# Patient Record
Sex: MALE | Race: BLACK OR AFRICAN AMERICAN | NOT HISPANIC OR LATINO | URBAN - METROPOLITAN AREA
[De-identification: names, ages, dates, MRNs, and addresses within clinical notes are randomized per-mention and may not be internally consistent; named-entity substitution may affect disease eponyms.]

---

## 2024-06-06 ENCOUNTER — TRANSCRIBE ORDERS (OUTPATIENT)
Dept: GENERAL RADIOLOGY | Facility: HOSPITAL | Age: 59
End: 2024-06-06

## 2024-11-17 ENCOUNTER — APPOINTMENT (OUTPATIENT)
Dept: ULTRASOUND IMAGING | Facility: HOSPITAL | Age: 59
DRG: 066 | End: 2024-11-17
Payer: MEDICAID

## 2024-11-17 ENCOUNTER — HOSPITAL ENCOUNTER (INPATIENT)
Facility: HOSPITAL | Age: 59
LOS: 4 days | Discharge: HOME | DRG: 066 | End: 2024-11-21
Attending: EMERGENCY MEDICINE | Admitting: INTERNAL MEDICINE
Payer: MEDICAID

## 2024-11-17 ENCOUNTER — APPOINTMENT (OUTPATIENT)
Dept: GENERAL RADIOLOGY | Facility: HOSPITAL | Age: 59
DRG: 066 | End: 2024-11-17
Payer: MEDICAID

## 2024-11-17 ENCOUNTER — APPOINTMENT (OUTPATIENT)
Dept: CT IMAGING | Facility: HOSPITAL | Age: 59
DRG: 066 | End: 2024-11-17
Payer: MEDICAID

## 2024-11-17 DIAGNOSIS — R13.10 DYSPHAGIA, UNSPECIFIED TYPE: ICD-10-CM

## 2024-11-17 DIAGNOSIS — R79.89 ELEVATED SERUM CREATININE: ICD-10-CM

## 2024-11-17 DIAGNOSIS — R20.0 LEFT SIDED NUMBNESS: Primary | ICD-10-CM

## 2024-11-17 DIAGNOSIS — I63.89 CEREBROVASCULAR ACCIDENT (CVA) DUE TO OTHER MECHANISM: ICD-10-CM

## 2024-11-17 DIAGNOSIS — R29.90 STROKE-LIKE SYMPTOMS: ICD-10-CM

## 2024-11-17 DIAGNOSIS — I10 SEVERE HYPERTENSION: ICD-10-CM

## 2024-11-17 DIAGNOSIS — Z78.9 DECREASED ACTIVITIES OF DAILY LIVING (ADL): ICD-10-CM

## 2024-11-17 PROBLEM — I63.9 STROKE: Status: ACTIVE | Noted: 2024-11-17

## 2024-11-17 LAB
ABO GROUP BLD: NORMAL
ALBUMIN SERPL-MCNC: 4.2 G/DL (ref 3.5–5.2)
ALBUMIN/GLOB SERPL: 1.2 G/DL
ALP SERPL-CCNC: 95 U/L (ref 39–117)
ALT SERPL W P-5'-P-CCNC: 26 U/L (ref 1–41)
ANION GAP SERPL CALCULATED.3IONS-SCNC: 10.3 MMOL/L (ref 5–15)
APTT PPP: 33.9 SECONDS (ref 24.2–34.2)
AST SERPL-CCNC: 23 U/L (ref 1–40)
BACTERIA UR QL AUTO: NORMAL /HPF
BASOPHILS # BLD AUTO: 0.03 10*3/MM3 (ref 0–0.2)
BASOPHILS NFR BLD AUTO: 0.5 % (ref 0–1.5)
BILIRUB SERPL-MCNC: 0.4 MG/DL (ref 0–1.2)
BILIRUB UR QL STRIP: NEGATIVE
BLD GP AB SCN SERPL QL: NEGATIVE
BUN SERPL-MCNC: 22 MG/DL (ref 6–20)
BUN/CREAT SERPL: 10.2 (ref 7–25)
CALCIUM SPEC-SCNC: 9.2 MG/DL (ref 8.6–10.5)
CHLORIDE SERPL-SCNC: 106 MMOL/L (ref 98–107)
CHOLEST SERPL-MCNC: 273 MG/DL (ref 0–200)
CLARITY UR: CLEAR
CO2 SERPL-SCNC: 25.7 MMOL/L (ref 22–29)
COLOR UR: YELLOW
CREAT SERPL-MCNC: 2.16 MG/DL (ref 0.76–1.27)
DEPRECATED RDW RBC AUTO: 40.9 FL (ref 37–54)
EGFRCR SERPLBLD CKD-EPI 2021: 34.4 ML/MIN/1.73
EOSINOPHIL # BLD AUTO: 0.11 10*3/MM3 (ref 0–0.4)
EOSINOPHIL NFR BLD AUTO: 2 % (ref 0.3–6.2)
ERYTHROCYTE [DISTWIDTH] IN BLOOD BY AUTOMATED COUNT: 12.5 % (ref 12.3–15.4)
GLOBULIN UR ELPH-MCNC: 3.4 GM/DL
GLUCOSE BLDC GLUCOMTR-MCNC: 113 MG/DL (ref 70–99)
GLUCOSE SERPL-MCNC: 100 MG/DL (ref 65–99)
GLUCOSE UR STRIP-MCNC: NEGATIVE MG/DL
HCT VFR BLD AUTO: 47.4 % (ref 37.5–51)
HDLC SERPL-MCNC: 44 MG/DL (ref 40–60)
HGB BLD-MCNC: 15.5 G/DL (ref 13–17.7)
HGB UR QL STRIP.AUTO: NEGATIVE
HOLD SPECIMEN: NORMAL
HOLD SPECIMEN: NORMAL
HYALINE CASTS UR QL AUTO: NORMAL /LPF
IMM GRANULOCYTES # BLD AUTO: 0.01 10*3/MM3 (ref 0–0.05)
IMM GRANULOCYTES NFR BLD AUTO: 0.2 % (ref 0–0.5)
INR PPP: 1.03 (ref 0.86–1.15)
KETONES UR QL STRIP: NEGATIVE
LDLC SERPL CALC-MCNC: 180 MG/DL (ref 0–100)
LDLC/HDLC SERPL: 4.05 {RATIO}
LEUKOCYTE ESTERASE UR QL STRIP.AUTO: NEGATIVE
LYMPHOCYTES # BLD AUTO: 1.14 10*3/MM3 (ref 0.7–3.1)
LYMPHOCYTES NFR BLD AUTO: 20.5 % (ref 19.6–45.3)
MCH RBC QN AUTO: 29.1 PG (ref 26.6–33)
MCHC RBC AUTO-ENTMCNC: 32.7 G/DL (ref 31.5–35.7)
MCV RBC AUTO: 88.9 FL (ref 79–97)
MONOCYTES # BLD AUTO: 0.62 10*3/MM3 (ref 0.1–0.9)
MONOCYTES NFR BLD AUTO: 11.2 % (ref 5–12)
NEUTROPHILS NFR BLD AUTO: 3.64 10*3/MM3 (ref 1.7–7)
NEUTROPHILS NFR BLD AUTO: 65.6 % (ref 42.7–76)
NITRITE UR QL STRIP: NEGATIVE
NRBC BLD AUTO-RTO: 0 /100 WBC (ref 0–0.2)
PH UR STRIP.AUTO: 5.5 [PH] (ref 5–8)
PLATELET # BLD AUTO: 281 10*3/MM3 (ref 140–450)
PMV BLD AUTO: 11.2 FL (ref 6–12)
POTASSIUM SERPL-SCNC: 4.3 MMOL/L (ref 3.5–5.2)
PROT SERPL-MCNC: 7.6 G/DL (ref 6–8.5)
PROT UR QL STRIP: ABNORMAL
PROTHROMBIN TIME: 13.7 SECONDS (ref 11.8–14.9)
RBC # BLD AUTO: 5.33 10*6/MM3 (ref 4.14–5.8)
RBC # UR STRIP: NORMAL /HPF
REF LAB TEST METHOD: NORMAL
RH BLD: POSITIVE
SODIUM SERPL-SCNC: 142 MMOL/L (ref 136–145)
SP GR UR STRIP: 1.02 (ref 1–1.03)
SQUAMOUS #/AREA URNS HPF: NORMAL /HPF
T&S EXPIRATION DATE: NORMAL
TRIGL SERPL-MCNC: 255 MG/DL (ref 0–150)
TROPONIN T SERPL HS-MCNC: 22 NG/L
TSH SERPL DL<=0.05 MIU/L-ACNC: 3.6 UIU/ML (ref 0.27–4.2)
UROBILINOGEN UR QL STRIP: ABNORMAL
VLDLC SERPL-MCNC: 49 MG/DL (ref 5–40)
WBC # UR STRIP: NORMAL /HPF
WBC NRBC COR # BLD AUTO: 5.55 10*3/MM3 (ref 3.4–10.8)
WHOLE BLOOD HOLD COAG: NORMAL
WHOLE BLOOD HOLD SPECIMEN: NORMAL

## 2024-11-17 PROCEDURE — 85610 PROTHROMBIN TIME: CPT | Performed by: EMERGENCY MEDICINE

## 2024-11-17 PROCEDURE — 82948 REAGENT STRIP/BLOOD GLUCOSE: CPT | Performed by: EMERGENCY MEDICINE

## 2024-11-17 PROCEDURE — 93005 ELECTROCARDIOGRAM TRACING: CPT | Performed by: EMERGENCY MEDICINE

## 2024-11-17 PROCEDURE — 86901 BLOOD TYPING SEROLOGIC RH(D): CPT | Performed by: EMERGENCY MEDICINE

## 2024-11-17 PROCEDURE — 99291 CRITICAL CARE FIRST HOUR: CPT

## 2024-11-17 PROCEDURE — 99223 1ST HOSP IP/OBS HIGH 75: CPT | Performed by: STUDENT IN AN ORGANIZED HEALTH CARE EDUCATION/TRAINING PROGRAM

## 2024-11-17 PROCEDURE — 84443 ASSAY THYROID STIM HORMONE: CPT | Performed by: STUDENT IN AN ORGANIZED HEALTH CARE EDUCATION/TRAINING PROGRAM

## 2024-11-17 PROCEDURE — 76775 US EXAM ABDO BACK WALL LIM: CPT

## 2024-11-17 PROCEDURE — 85730 THROMBOPLASTIN TIME PARTIAL: CPT | Performed by: EMERGENCY MEDICINE

## 2024-11-17 PROCEDURE — 85025 COMPLETE CBC W/AUTO DIFF WBC: CPT | Performed by: EMERGENCY MEDICINE

## 2024-11-17 PROCEDURE — 80061 LIPID PANEL: CPT | Performed by: STUDENT IN AN ORGANIZED HEALTH CARE EDUCATION/TRAINING PROGRAM

## 2024-11-17 PROCEDURE — 83036 HEMOGLOBIN GLYCOSYLATED A1C: CPT | Performed by: STUDENT IN AN ORGANIZED HEALTH CARE EDUCATION/TRAINING PROGRAM

## 2024-11-17 PROCEDURE — 25810000003 SODIUM CHLORIDE 0.9 % SOLUTION: Performed by: EMERGENCY MEDICINE

## 2024-11-17 PROCEDURE — 84484 ASSAY OF TROPONIN QUANT: CPT | Performed by: EMERGENCY MEDICINE

## 2024-11-17 PROCEDURE — 81001 URINALYSIS AUTO W/SCOPE: CPT | Performed by: EMERGENCY MEDICINE

## 2024-11-17 PROCEDURE — 99285 EMERGENCY DEPT VISIT HI MDM: CPT

## 2024-11-17 PROCEDURE — 25810000003 LACTATED RINGERS PER 1000 ML: Performed by: STUDENT IN AN ORGANIZED HEALTH CARE EDUCATION/TRAINING PROGRAM

## 2024-11-17 PROCEDURE — 86900 BLOOD TYPING SEROLOGIC ABO: CPT | Performed by: EMERGENCY MEDICINE

## 2024-11-17 PROCEDURE — 71045 X-RAY EXAM CHEST 1 VIEW: CPT

## 2024-11-17 PROCEDURE — 70450 CT HEAD/BRAIN W/O DYE: CPT

## 2024-11-17 PROCEDURE — 80053 COMPREHEN METABOLIC PANEL: CPT | Performed by: EMERGENCY MEDICINE

## 2024-11-17 PROCEDURE — 25010000002 ACETAMINOPHEN 10 MG/ML SOLUTION: Performed by: EMERGENCY MEDICINE

## 2024-11-17 PROCEDURE — 86850 RBC ANTIBODY SCREEN: CPT | Performed by: EMERGENCY MEDICINE

## 2024-11-17 RX ORDER — ACETAMINOPHEN 10 MG/ML
1000 INJECTION, SOLUTION INTRAVENOUS ONCE
Status: COMPLETED | OUTPATIENT
Start: 2024-11-17 | End: 2024-11-17

## 2024-11-17 RX ORDER — SODIUM CHLORIDE 0.9 % (FLUSH) 0.9 %
10 SYRINGE (ML) INJECTION EVERY 12 HOURS SCHEDULED
Status: DISCONTINUED | OUTPATIENT
Start: 2024-11-17 | End: 2024-11-21 | Stop reason: HOSPADM

## 2024-11-17 RX ORDER — ONDANSETRON 2 MG/ML
4 INJECTION INTRAMUSCULAR; INTRAVENOUS EVERY 6 HOURS PRN
Status: DISCONTINUED | OUTPATIENT
Start: 2024-11-17 | End: 2024-11-21 | Stop reason: HOSPADM

## 2024-11-17 RX ORDER — SODIUM CHLORIDE 0.9 % (FLUSH) 0.9 %
10 SYRINGE (ML) INJECTION AS NEEDED
Status: DISCONTINUED | OUTPATIENT
Start: 2024-11-17 | End: 2024-11-21 | Stop reason: HOSPADM

## 2024-11-17 RX ORDER — POLYETHYLENE GLYCOL 3350 17 G/17G
17 POWDER, FOR SOLUTION ORAL DAILY PRN
Status: DISCONTINUED | OUTPATIENT
Start: 2024-11-17 | End: 2024-11-21 | Stop reason: HOSPADM

## 2024-11-17 RX ORDER — BISACODYL 10 MG
10 SUPPOSITORY, RECTAL RECTAL DAILY PRN
Status: DISCONTINUED | OUTPATIENT
Start: 2024-11-17 | End: 2024-11-21 | Stop reason: HOSPADM

## 2024-11-17 RX ORDER — ACETAMINOPHEN 325 MG/1
650 TABLET ORAL EVERY 6 HOURS PRN
Status: DISCONTINUED | OUTPATIENT
Start: 2024-11-17 | End: 2024-11-21 | Stop reason: HOSPADM

## 2024-11-17 RX ORDER — HYDRALAZINE HYDROCHLORIDE 20 MG/ML
10 INJECTION INTRAMUSCULAR; INTRAVENOUS EVERY 6 HOURS PRN
Status: DISCONTINUED | OUTPATIENT
Start: 2024-11-17 | End: 2024-11-21 | Stop reason: HOSPADM

## 2024-11-17 RX ORDER — NITROGLYCERIN 0.4 MG/1
0.4 TABLET SUBLINGUAL
Status: DISCONTINUED | OUTPATIENT
Start: 2024-11-17 | End: 2024-11-21 | Stop reason: HOSPADM

## 2024-11-17 RX ORDER — SODIUM CHLORIDE 9 MG/ML
40 INJECTION, SOLUTION INTRAVENOUS AS NEEDED
Status: DISCONTINUED | OUTPATIENT
Start: 2024-11-17 | End: 2024-11-21 | Stop reason: HOSPADM

## 2024-11-17 RX ORDER — BISACODYL 5 MG/1
5 TABLET, DELAYED RELEASE ORAL DAILY PRN
Status: DISCONTINUED | OUTPATIENT
Start: 2024-11-17 | End: 2024-11-21 | Stop reason: HOSPADM

## 2024-11-17 RX ORDER — SODIUM CHLORIDE, SODIUM LACTATE, POTASSIUM CHLORIDE, CALCIUM CHLORIDE 600; 310; 30; 20 MG/100ML; MG/100ML; MG/100ML; MG/100ML
50 INJECTION, SOLUTION INTRAVENOUS CONTINUOUS
Status: DISCONTINUED | OUTPATIENT
Start: 2024-11-17 | End: 2024-11-18

## 2024-11-17 RX ORDER — ASPIRIN 325 MG
325 TABLET ORAL DAILY
Status: DISCONTINUED | OUTPATIENT
Start: 2024-11-18 | End: 2024-11-18

## 2024-11-17 RX ORDER — AMOXICILLIN 250 MG
2 CAPSULE ORAL 2 TIMES DAILY PRN
Status: DISCONTINUED | OUTPATIENT
Start: 2024-11-17 | End: 2024-11-21 | Stop reason: HOSPADM

## 2024-11-17 RX ADMIN — Medication 10 ML: at 23:05

## 2024-11-17 RX ADMIN — SODIUM CHLORIDE 1000 ML: 9 INJECTION, SOLUTION INTRAVENOUS at 20:03

## 2024-11-17 RX ADMIN — ACETAMINOPHEN 1000 MG: 10 INJECTION INTRAVENOUS at 20:03

## 2024-11-17 RX ADMIN — SODIUM CHLORIDE, POTASSIUM CHLORIDE, SODIUM LACTATE AND CALCIUM CHLORIDE 50 ML/HR: 600; 310; 30; 20 INJECTION, SOLUTION INTRAVENOUS at 23:04

## 2024-11-17 NOTE — ED PROVIDER NOTES
"Time: 6:28 PM EST  Date of encounter:  11/17/2024  Independent Historian/Clinical History and Information was obtained by:   Patient    History is limited by: N/A    Chief Complaint: Speech difficulty, numbness      History of Present Illness:  Patient is a 58 y.o. year old male who presents to the emergency department for evaluation of left-sided numbness, left arm numbness and facial numbness and difficulty with his speech.  States that it started around 6 PM this evening.  Patient does report the numbness is improved but his speech still does not sound the same and he is having difficulty getting his words out.      Patient Care Team  Primary Care Provider: Provider, No Known    Past Medical History:     No Known Allergies  No past medical history on file.  No past surgical history on file.  No family history on file.    Home Medications:  Prior to Admission medications    Not on File        Social History:          Review of Systems:  Review of Systems   Neurological:  Positive for speech difficulty and numbness.        Physical Exam:  BP (!) 199/115   Pulse 69   Temp 98.9 °F (37.2 °C) (Oral)   Resp 18   Ht 175.3 cm (69\")   Wt 109 kg (240 lb 15.4 oz)   SpO2 97%   BMI 35.58 kg/m²     Physical Exam  Vitals and nursing note reviewed.   Constitutional:       Appearance: Normal appearance.   HENT:      Head: Normocephalic and atraumatic.   Eyes:      General: No scleral icterus.  Cardiovascular:      Rate and Rhythm: Normal rate and regular rhythm.      Heart sounds: Normal heart sounds.   Pulmonary:      Effort: Pulmonary effort is normal.      Breath sounds: Normal breath sounds.   Abdominal:      Palpations: Abdomen is soft.      Tenderness: There is no abdominal tenderness.   Musculoskeletal:         General: Normal range of motion.      Cervical back: Normal range of motion.   Skin:     Findings: No rash.   Neurological:      General: No focal deficit present.      Mental Status: He is alert and oriented " to person, place, and time.      Cranial Nerves: No cranial nerve deficit.      Sensory: No sensory deficit.      Motor: No weakness.                  Procedures:  Procedures      Medical Decision Making:      Comorbidities that affect care:    Hypertension,     External Notes reviewed:    No previous notes noted      The following orders were placed and all results were independently analyzed by me:  Orders Placed This Encounter   Procedures    CT Head Without Contrast Stroke Protocol    XR Chest 1 View    [place] Draw    Comprehensive Metabolic Panel    Protime-INR    aPTT    Single High Sensitivity Troponin T    Urinalysis With Microscopic If Indicated (No Culture) - Urine, Clean Catch    CBC Auto Differential    NPO Diet NPO Type: Strict NPO    Initiate Department's Acute Stroke Process (Team D, Code 19, etc)    Perform NIH Stroke Scale    Measure Actual Weight    Head of Bed 30 Degrees or Less    Undress and Gown    Continuous Pulse Oximetry    Vital Signs    Neuro Checks    Notify Provider for SBP <80 or >200    Notify Provider for SBP >140 (For Hemorrhagic Stroke)    No Hypotonic Fluids    Nursing Dysphagia Screening (Complete Prior to Giving anything PO)    RN to Place Order SLP Consult (IF swallow screen failed) - Eval & Treat Choosing Reason of RN Dysphagia Screen Failed    Inpatient Hospitalist Consult    Oxygen Therapy- Nasal Cannula; Titrate 1-6 LPM Per SpO2; 90 - 95%    POC Glucose Once    ECG 12 Lead ED Triage Standing Order; Acute Stroke (Onset <12 hrs)    Type & Screen    ABO RH Specimen Verification    Insert Large Bore Peripheral IV - Right AC Preferred    CBC & Differential    Green Top (Gel)    Lavender Top    Gold Top - SST    Light Blue Top       Medications Given in the Emergency Department:  Medications   sodium chloride 0.9 % flush 10 mL (has no administration in time range)   sodium chloride 0.9 % bolus 1,000 mL (1,000 mL Intravenous New Bag 11/17/24 2003)   acetaminophen (OFIRMEV)  injection 1,000 mg (1,000 mg Intravenous Given 11/17/24 2003)        ED Course:    ED Course as of 11/17/24 2043   Sun Nov 17, 2024 1934 EKG interpreted by me  Time: 1853  Heart rate 67  Sinus, LVH, nonspecific ST changes,  [MA]   2031 Recheck patient.  Patient denies any history of kidney issues.  Discussed admission to the hospital for further workup and management.  Currently NIH is 0 [MA]   2042 Spoke with Dr. Zimmer who agrees to admit [MA]      ED Course User Index  [MA] Ariel Moore MD       Labs:    Lab Results (last 24 hours)       Procedure Component Value Units Date/Time    CBC & Differential [[id number]]  (Normal) Collected: 11/17/24 1820    Specimen: Blood Updated: 11/17/24 1832    Narrative:      The following orders were created for panel order CBC & Differential.  Procedure                               Abnormality         Status                     ---------                               -----------         ------                     CBC Auto Differential[[id number]]        Normal              Final result                 Please view results for these tests on the individual orders.    Comprehensive Metabolic Panel [[id number]]  (Abnormal) Collected: 11/17/24 1820    Specimen: Blood Updated: 11/17/24 1857     Glucose 100 mg/dL      BUN 22 mg/dL      Creatinine 2.16 mg/dL      Sodium 142 mmol/L      Potassium 4.3 mmol/L      Chloride 106 mmol/L      CO2 25.7 mmol/L      Calcium 9.2 mg/dL      Total Protein 7.6 g/dL      Albumin 4.2 g/dL      ALT (SGPT) 26 U/L      AST (SGOT) 23 U/L      Alkaline Phosphatase 95 U/L      Total Bilirubin 0.4 mg/dL      Globulin 3.4 gm/dL      A/G Ratio 1.2 g/dL      BUN/Creatinine Ratio 10.2     Anion Gap 10.3 mmol/L      eGFR 34.4 mL/min/1.73     Narrative:      GFR Normal >60  Chronic Kidney Disease <60  Kidney Failure <15      Protime-INR [[id number]]  (Normal) Collected: 11/17/24 1820    Specimen: Blood Updated: 11/17/24 1844     Protime 13.7 Seconds       INR 1.03    Narrative:      Suggested Therapeutic Ranges For Oral Anticoagulant Therapy:  Level of Therapy                      INR Target Range  Standard Dose                            2.0-3.0  High Dose                                2.5-3.5  Patients not receiving anticoagulant  Therapy Normal Range                     0.86-1.15    aPTT [[id number]]  (Normal) Collected: 11/17/24 1820    Specimen: Blood Updated: 11/17/24 1844     PTT 33.9 seconds     Single High Sensitivity Troponin T [[id number]]  (Abnormal) Collected: 11/17/24 1820    Specimen: Blood Updated: 11/17/24 1857     HS Troponin T 22 ng/L     Narrative:      High Sensitive Troponin T Reference Range:  <14.0 ng/L- Negative Female for AMI  <22.0 ng/L- Negative Male for AMI  >=14 - Abnormal Female indicating possible myocardial injury.  >=22 - Abnormal Male indicating possible myocardial injury.   Clinicians would have to utilize clinical acumen, EKG, Troponin, and serial changes to determine if it is an Acute Myocardial Infarction or myocardial injury due to an underlying chronic condition.         CBC Auto Differential [[id number]]  (Normal) Collected: 11/17/24 1820    Specimen: Blood Updated: 11/17/24 1832     WBC 5.55 10*3/mm3      RBC 5.33 10*6/mm3      Hemoglobin 15.5 g/dL      Hematocrit 47.4 %      MCV 88.9 fL      MCH 29.1 pg      MCHC 32.7 g/dL      RDW 12.5 %      RDW-SD 40.9 fl      MPV 11.2 fL      Platelets 281 10*3/mm3      Neutrophil % 65.6 %      Lymphocyte % 20.5 %      Monocyte % 11.2 %      Eosinophil % 2.0 %      Basophil % 0.5 %      Immature Grans % 0.2 %      Neutrophils, Absolute 3.64 10*3/mm3      Lymphocytes, Absolute 1.14 10*3/mm3      Monocytes, Absolute 0.62 10*3/mm3      Eosinophils, Absolute 0.11 10*3/mm3      Basophils, Absolute 0.03 10*3/mm3      Immature Grans, Absolute 0.01 10*3/mm3      nRBC 0.0 /100 WBC     POC Glucose Once [[id number]]  (Abnormal) Collected: 11/17/24 1824    Specimen: Blood Updated: 11/17/24 1826      Glucose 113 mg/dL      Comment: Serial Number: [id number]:  [id number]                Imaging:    XR Chest 1 View    Result Date: 11/17/2024  XR CHEST 1 VW-  Date of exam: 11/17/2024, 7:00 p.m.  Indication: Acute stroke protocol (onset < 12 hrs).  Comparison: 3/14/2016.  FINDINGS: A single AP (or PA) upright portable chest radiograph was performed. No cardiac enlargement is seen. No acute infiltrate is appreciated. No pleural effusion or pneumothorax is identified. External artifacts obscure detail. Degenerative changes involve the imaged spine and the bilateral shoulders. No significant interval change is seen since the prior study (or studies).       No acute infiltrate is appreciated. No cardiac enlargement.    Portions of this note were completed with a voice recognition program.  Electronically Signed By-Stalin Hernández MD On:11/17/2024 7:05 PM      CT Head Without Contrast Stroke Protocol    Result Date: 11/17/2024  CT HEAD WO CONTRAST STROKE PROTOCOL Date of Exam: 11/17/2024 6:30 PM EST Indication: Neuro Deficit, acute, Stroke suspected Neuro deficit, acute, stroke suspected. Comparison: None available. Technique: Axial CT images were obtained of the head without contrast administration.  Reconstructed coronal and sagittal images were also obtained. Automated exposure control and iterative construction methods were used. Findings: No evidence of acute intracranial hemorrhage or mass effect. No extra-axial collection. The gray white matter differentiation is preserved. Ventricles and sulci are symmetric. The mastoid air cells and paranasal sinuses are well aerated. Globes and extraocular muscles are unremarkable. No acute or suspicious osseous abnormality. Soft tissues within normal limits.     Impression: No evidence of acute intracranial abnormality. Electronically Signed: Jt Schwartz MD  11/17/2024 6:36 PM EST  Workstation ID: [id number]       Differential Diagnosis and Discussion:    Stroke:  Differential diagnosis in this patient with signs of possible ischemic stroke include TIA or ischemic stroke, hemorrhagic stroke, hypoglycemic episode, toxic or metabolic encephalopathy, paresthesias.    All labs were reviewed and interpreted by me.  All X-rays impressions were independently interpreted by me.  EKG was interpreted by me.  CT scan radiology impression was interpreted by me.    MDM     Patient is a 58-year-old gentleman who presents with complaints of left-sided numbness as well as difficulty with his speech.  On exam he did not have significant focal deficits for me.  He was reporting some difficulty getting his words out as well as had left-sided facial numbness and left upper extremity numbness.  He was found to be severely hypertensive as well as had elevated creatinine.  He tells me that he is never had any issues with his kidney function.  From his EKG it does appear he has some LVH and likely has some longstanding hypertensive issues.  Creatinine could just be elevated because of chronic kidney issues from hypertension.  At this time though because of concern for possible stroke allowing permissive hypertension.  Will need admission to the hospital for further workup management.  Has been given fluids.      Total Critical Care time of 33 minutes. Total critical care time documented does not include time spent on separately billed procedures for services of nurses or physician assistants. I personally saw and examined the patient. I have reviewed all diagnostic interpretations and treatment plans as written. I was present for the key portions of any procedures performed and the inclusive time noted in any critical care statement. Critical care time includes patient management by me, time spent at the patients bedside,  time to review lab and imaging results, discussing patient care, documentation in the medical record, and time spent with family or caregiver.          Patient Care  Considerations:          Consultants/Shared Management Plan:    Hospitalist: I have discussed the case with Dr. Zimmer who agrees to accept the patient for admission.    Social Determinants of Health:    Patient is independent, reliable, and has access to care.       Disposition and Care Coordination:    Admit:   Through independent evaluation of the patient's history, physical, and imperical data, the patient meets criteria for inpatient admission to the hospital.        Final diagnoses:   Left sided numbness   Stroke-like symptoms   Elevated serum creatinine   Severe hypertension        ED Disposition       ED Disposition   Decision to Admit    Condition   --    Comment   --               This medical record created using voice recognition software.             Ariel Moore MD  11/17/24 4604

## 2024-11-17 NOTE — ED TRIAGE NOTES
Pt arrives to ED from upstairs; patient was visit his wife on the 5th floor when all of a sudden his speech became slurred.. per family his speech is off too

## 2024-11-18 ENCOUNTER — APPOINTMENT (OUTPATIENT)
Dept: MRI IMAGING | Facility: HOSPITAL | Age: 59
DRG: 066 | End: 2024-11-18
Payer: MEDICAID

## 2024-11-18 ENCOUNTER — APPOINTMENT (OUTPATIENT)
Facility: HOSPITAL | Age: 59
DRG: 066 | End: 2024-11-18
Payer: MEDICAID

## 2024-11-18 ENCOUNTER — APPOINTMENT (OUTPATIENT)
Dept: CT IMAGING | Facility: HOSPITAL | Age: 59
DRG: 066 | End: 2024-11-18
Payer: MEDICAID

## 2024-11-18 ENCOUNTER — APPOINTMENT (OUTPATIENT)
Dept: CARDIOLOGY | Facility: HOSPITAL | Age: 59
DRG: 066 | End: 2024-11-18
Payer: MEDICAID

## 2024-11-18 LAB
ABO GROUP BLD: NORMAL
ANION GAP SERPL CALCULATED.3IONS-SCNC: 10.3 MMOL/L (ref 5–15)
BACTERIA UR QL AUTO: NORMAL /HPF
BH CV XLRA MEAS LEFT CAROTID BULB EDV: 13.1 CM/SEC
BH CV XLRA MEAS LEFT CAROTID BULB PSV: 48.8 CM/SEC
BH CV XLRA MEAS LEFT DIST CCA EDV: 18.8 CM/SEC
BH CV XLRA MEAS LEFT DIST CCA PSV: 103.2 CM/SEC
BH CV XLRA MEAS LEFT DIST ICA EDV: 25 CM/SEC
BH CV XLRA MEAS LEFT DIST ICA PSV: 88.1 CM/SEC
BH CV XLRA MEAS LEFT ICA/CCA RATIO: 0.6
BH CV XLRA MEAS LEFT MID ICA EDV: 21 CM/SEC
BH CV XLRA MEAS LEFT MID ICA PSV: 74.2 CM/SEC
BH CV XLRA MEAS LEFT PROX CCA EDV: 20.2 CM/SEC
BH CV XLRA MEAS LEFT PROX CCA PSV: 103.3 CM/SEC
BH CV XLRA MEAS LEFT PROX ECA EDV: 14.7 CM/SEC
BH CV XLRA MEAS LEFT PROX ECA PSV: 88.1 CM/SEC
BH CV XLRA MEAS LEFT PROX ICA EDV: 26.2 CM/SEC
BH CV XLRA MEAS LEFT PROX ICA PSV: 61.1 CM/SEC
BH CV XLRA MEAS LEFT VERTEBRAL A EDV: 15.9 CM/SEC
BH CV XLRA MEAS LEFT VERTEBRAL A PSV: 55.1 CM/SEC
BH CV XLRA MEAS RIGHT CAROTID BULB EDV: 17.7 CM/SEC
BH CV XLRA MEAS RIGHT CAROTID BULB PSV: 69.9 CM/SEC
BH CV XLRA MEAS RIGHT DIST CCA EDV: 16.8 CM/SEC
BH CV XLRA MEAS RIGHT DIST CCA PSV: 87.2 CM/SEC
BH CV XLRA MEAS RIGHT DIST ICA EDV: 26.4 CM/SEC
BH CV XLRA MEAS RIGHT DIST ICA PSV: 92.4 CM/SEC
BH CV XLRA MEAS RIGHT ICA/CCA RATIO: 0.8
BH CV XLRA MEAS RIGHT MID ICA EDV: 28.1 CM/SEC
BH CV XLRA MEAS RIGHT MID ICA PSV: 82.5 CM/SEC
BH CV XLRA MEAS RIGHT PROX CCA EDV: 22.2 CM/SEC
BH CV XLRA MEAS RIGHT PROX CCA PSV: 105.3 CM/SEC
BH CV XLRA MEAS RIGHT PROX ECA EDV: 23 CM/SEC
BH CV XLRA MEAS RIGHT PROX ECA PSV: 165 CM/SEC
BH CV XLRA MEAS RIGHT PROX ICA EDV: 20.5 CM/SEC
BH CV XLRA MEAS RIGHT PROX ICA PSV: 73.8 CM/SEC
BH CV XLRA MEAS RIGHT VERTEBRAL A EDV: 12.4 CM/SEC
BH CV XLRA MEAS RIGHT VERTEBRAL A PSV: 36.7 CM/SEC
BILIRUB UR QL STRIP: NEGATIVE
BUN SERPL-MCNC: 22 MG/DL (ref 6–20)
BUN/CREAT SERPL: 11.2 (ref 7–25)
CALCIUM SPEC-SCNC: 9.1 MG/DL (ref 8.6–10.5)
CHLORIDE SERPL-SCNC: 108 MMOL/L (ref 98–107)
CLARITY UR: CLEAR
CO2 SERPL-SCNC: 23.7 MMOL/L (ref 22–29)
COLOR UR: YELLOW
CREAT SERPL-MCNC: 1.97 MG/DL (ref 0.76–1.27)
CREAT UR-MCNC: 87.1 MG/DL
EGFRCR SERPLBLD CKD-EPI 2021: 38.4 ML/MIN/1.73
GLUCOSE SERPL-MCNC: 91 MG/DL (ref 65–99)
GLUCOSE UR STRIP-MCNC: NEGATIVE MG/DL
HBA1C MFR BLD: 5.2 % (ref 4.8–5.6)
HGB UR QL STRIP.AUTO: NEGATIVE
HOLD SPECIMEN: NORMAL
HYALINE CASTS UR QL AUTO: NORMAL /LPF
KETONES UR QL STRIP: NEGATIVE
LEFT ARM BP: NORMAL MMHG
LEUKOCYTE ESTERASE UR QL STRIP.AUTO: NEGATIVE
NITRITE UR QL STRIP: NEGATIVE
PH UR STRIP.AUTO: 6 [PH] (ref 5–8)
POTASSIUM SERPL-SCNC: 4.4 MMOL/L (ref 3.5–5.2)
PROT ?TM UR-MCNC: 137.5 MG/DL
PROT UR QL STRIP: ABNORMAL
PROT/CREAT UR: 1.58 MG/G{CREAT}
RBC # UR STRIP: NORMAL /HPF
REF LAB TEST METHOD: NORMAL
RH BLD: POSITIVE
RIGHT ARM BP: NORMAL MMHG
SODIUM SERPL-SCNC: 142 MMOL/L (ref 136–145)
SP GR UR STRIP: 1.02 (ref 1–1.03)
SQUAMOUS #/AREA URNS HPF: NORMAL /HPF
UROBILINOGEN UR QL STRIP: ABNORMAL
WBC # UR STRIP: NORMAL /HPF
WHOLE BLOOD HOLD SPECIMEN: NORMAL

## 2024-11-18 PROCEDURE — 93306 TTE W/DOPPLER COMPLETE: CPT

## 2024-11-18 PROCEDURE — 25510000001 IOPAMIDOL PER 1 ML: Performed by: FAMILY MEDICINE

## 2024-11-18 PROCEDURE — 70496 CT ANGIOGRAPHY HEAD: CPT

## 2024-11-18 PROCEDURE — 93306 TTE W/DOPPLER COMPLETE: CPT | Performed by: STUDENT IN AN ORGANIZED HEALTH CARE EDUCATION/TRAINING PROGRAM

## 2024-11-18 PROCEDURE — 86900 BLOOD TYPING SEROLOGIC ABO: CPT

## 2024-11-18 PROCEDURE — 80048 BASIC METABOLIC PNL TOTAL CA: CPT | Performed by: FAMILY MEDICINE

## 2024-11-18 PROCEDURE — 99232 SBSQ HOSP IP/OBS MODERATE 35: CPT | Performed by: FAMILY MEDICINE

## 2024-11-18 PROCEDURE — 70551 MRI BRAIN STEM W/O DYE: CPT

## 2024-11-18 PROCEDURE — 93880 EXTRACRANIAL BILAT STUDY: CPT | Performed by: SURGERY

## 2024-11-18 PROCEDURE — 82570 ASSAY OF URINE CREATININE: CPT | Performed by: INTERNAL MEDICINE

## 2024-11-18 PROCEDURE — 99231 SBSQ HOSP IP/OBS SF/LOW 25: CPT | Performed by: PSYCHIATRY & NEUROLOGY

## 2024-11-18 PROCEDURE — 70498 CT ANGIOGRAPHY NECK: CPT

## 2024-11-18 PROCEDURE — 36415 COLL VENOUS BLD VENIPUNCTURE: CPT | Performed by: EMERGENCY MEDICINE

## 2024-11-18 PROCEDURE — 81001 URINALYSIS AUTO W/SCOPE: CPT | Performed by: STUDENT IN AN ORGANIZED HEALTH CARE EDUCATION/TRAINING PROGRAM

## 2024-11-18 PROCEDURE — 97161 PT EVAL LOW COMPLEX 20 MIN: CPT

## 2024-11-18 PROCEDURE — 84156 ASSAY OF PROTEIN URINE: CPT | Performed by: INTERNAL MEDICINE

## 2024-11-18 PROCEDURE — 70544 MR ANGIOGRAPHY HEAD W/O DYE: CPT

## 2024-11-18 PROCEDURE — 93880 EXTRACRANIAL BILAT STUDY: CPT

## 2024-11-18 PROCEDURE — 86901 BLOOD TYPING SEROLOGIC RH(D): CPT

## 2024-11-18 RX ORDER — LABETALOL HYDROCHLORIDE 5 MG/ML
10 INJECTION, SOLUTION INTRAVENOUS EVERY 6 HOURS PRN
Status: DISCONTINUED | OUTPATIENT
Start: 2024-11-18 | End: 2024-11-21 | Stop reason: HOSPADM

## 2024-11-18 RX ORDER — ASPIRIN 81 MG/1
81 TABLET ORAL DAILY
Status: DISCONTINUED | OUTPATIENT
Start: 2024-11-18 | End: 2024-11-21 | Stop reason: HOSPADM

## 2024-11-18 RX ORDER — IOPAMIDOL 755 MG/ML
100 INJECTION, SOLUTION INTRAVASCULAR
Status: COMPLETED | OUTPATIENT
Start: 2024-11-18 | End: 2024-11-18

## 2024-11-18 RX ORDER — CLOPIDOGREL BISULFATE 75 MG/1
75 TABLET ORAL DAILY
Status: DISCONTINUED | OUTPATIENT
Start: 2024-11-18 | End: 2024-11-21 | Stop reason: HOSPADM

## 2024-11-18 RX ORDER — ATORVASTATIN CALCIUM 40 MG/1
40 TABLET, FILM COATED ORAL DAILY
Status: DISCONTINUED | OUTPATIENT
Start: 2024-11-18 | End: 2024-11-21

## 2024-11-18 RX ADMIN — Medication 10 ML: at 20:45

## 2024-11-18 RX ADMIN — CLOPIDOGREL BISULFATE 75 MG: 75 TABLET ORAL at 09:00

## 2024-11-18 RX ADMIN — ATORVASTATIN CALCIUM 40 MG: 40 TABLET, FILM COATED ORAL at 09:00

## 2024-11-18 RX ADMIN — IOPAMIDOL 90 ML: 755 INJECTION, SOLUTION INTRAVENOUS at 18:59

## 2024-11-18 RX ADMIN — ACETAMINOPHEN 650 MG: 325 TABLET ORAL at 09:00

## 2024-11-18 RX ADMIN — ASPIRIN 81 MG: 81 TABLET, COATED ORAL at 09:00

## 2024-11-18 NOTE — PROGRESS NOTES
"[facility] Tele[facility]urology Progress Note    Date of Service 11/18/2024      Presentation:    Based on previous neurology note(s)   : \" 58-year-old gentleman with undiagnosed and uncontrolled hypertension who presents with right-sided headache and left arm and face numbness, NIHSS is 1. Blood pressures in the 200s over 100s.       Interval history:        11/18/2024: nursing does not report any significant new events overnight.     Impression:    Left facial numbness, likely a small subcortical right hemispheric Acute Ischemic Stroke, small vessel disease vs TIA (Transient Ischemic Attack)    Recommendations:   (Primary Team to order Controlled Medications)  Unless specifically noted I Agree with Impression and Plan from previous Neurology note/consult.    1- Risk factor management If stroke is confirmed:  Statin for Goal LDL<70, primary team to order    Goal HbA1c<7;     Blood pressure management:  If acute stroke is confirmed then During the first 48 hours after stroke onset If BP greater than 220/120 give IV Labetalol or Enalapril; then after 48 hours from stroke onset should slowly and gradually lower Blood Pressure to <130/80 over the course of one week (if no cardiovascular contraindication or any critical/severe intra or extracranial arterial stenosis, and if it does not elicit or exacerbate symptoms).  if stroke is ruled out then target normotension.  avoid hypotension or rapid decrease in Blood Pressure.      2- Work up and Results:   Brain MRI: Pending  Head and neck Vascular imaging: Carotid Ultrasound Pending, I ordered MRA head  ECHO: Pending   LDL and HbA1c: 180/?    3- Antithrombotic regimen:  A- for now maintain on Aspirin (I changed dose to 81mg daily), also added plavix, duration of dual antiplatelet therapy depends on status of head and neck arterial atherosclerosis, (21 days of dual antiplatelet therapy if no major arterial atherosclerosis or 90 days if there is major intracranial arterial " atherosclerosis) after this period of dual Antiplatelet therapy should stop plavix and continue with Aspirin monotherapy indefinitely.     4- Nursing recommendations:  IV Fluids, avoid dextrose containing fluids  Maintain euglycemia  Neuro checks every 4 hours for 24 hours and then per shift  Head of bed 30 degrees    5- Life Style modifications for stroke prevention should be followed:  Diet. Mediterranean diet rich in fruits, vegetables, whole grains, fish, legumes, plant-based oils preferred over animal fats. Reduce sodium intake as directed by primary care physician.  Exercise. Aim for 150-minutes of moderate to intense exercise per week in sessions of at least 10 minutes duration as tolerated.  Tobacco. Tobacco cessation with counseling and/or pharmacologic management  Alcohol. Reduce alcohol consumption as directed by primary care physician.  Hormone therapy. Avoid estrogen and testosterone containing medications  Sleep. Evaluation and treatment of sleep apnea  Return precautions. Seek emergency medical attention if you cannot see, speak, move or feel as you do normally for any abnormal length of time as these may be signs of a stroke      [facility] Neurologist will follow up with results.  Please contact [facility] Navigator to reach me if further questions/concerns arise.    Examination:    awake, alert, Oriented x3  speech no aphasia  Extraocular movements intact  face left Nasolabial fold flattening   arms no drift (10s)  coordination intact in finger to nose    light touch sensation is intact and symmetric throughout     -----------------------------------------------------------  Patient / Family was informed the Neurology Consult would occur via Tele[facility] consult by way of interactive audio and video telecommunications and consented to receiving care in this manner.     Patient is being evaluated for possible acute neurologic impairment and high probability of imminent or life - threatening  deterioration. I spent total of 13 minutes providing care to this patient, including time for face to face visit via telemedicine, review of medical records, imaging studies and discussion of findings with providers, the patient and / or family.        Dr Vahid Behravan        [facility]  For Inpatient follow-up with [facility] physician please call [facility] [phone]. This is not an outpatient service. Post hospital discharge, please contact hospital directly.     Please do not communicate with [facility] physicians via secure chat. If you have any questions, Please contact [facility].  Please call or reconsult our service if there are any clinical or diagnostic changes.

## 2024-11-18 NOTE — CONSULTS
[facility]  [facility] Tele[facility]urology Consult Services      Patient Name:   Rosalio Restrepo  YOB: 1965  Identification Number:   MRN - [id number]  Date of Service:   11/17/2024 18:29:51    Diagnosis:        I63.00 - Cerebrovascular accident (CVA) due to thrombosis of precerebral artery (HCCC)    Impression:          This is a 58-year-old gentleman with undiagnosed and uncontrolled hypertension who presents with right-sided headache and left arm and face numbness, NIHSS is 1. Blood pressures in the 200s over 100s. Not a candidate for IV thrombolytic therapy given nondisabling and left symptoms. Noncontrast CT head is negative. Presentation is concerning for an acute ischemic stroke versus disease. Would recommend admission for stroke workup. Would allow permissive hypertension to 220/110 until stroke is ruled out. Would load with aspirin 325 mg now and start on aspirin 81 mg daily. MRI brain without contrast and MRA head and neck for stroke workup. Check LDL, A1c echocardiogram. Recommend in-house neurology consultation for continued follow-up.    Our recommendations are outlined below.    Recommendations:          Stroke/Telemetry Floor        Neuro Checks        Bedside Swallow Eval        DVT Prophylaxis        IV Fluids, Normal Saline        Head of Bed 30 Degrees        Euglycemia and Avoid Hyperthermia (PRN Acetaminophen)        Initiate or continue Aspirin 325 MG daily        Antihypertensives PRN if Blood pressure is greater than 220/120 or there is a concern for End organ damage/contraindications for permissive HTN. If blood pressure is greater than 220/120 give labetalol PO or IV or Vasotec IV with a goal of 15% reduction in BP during the first 24 hours.    Sign Out:        Discussed with Emergency Department Provider        ------------------------------------------------------------------------------    Metrics:  Last Known Well: 11/17/2024 18:39:12  Dispatch Time: 11/17/2024  18:28:58  Arrival Time: 11/17/2024 18:09:00  Initial Response Time: 11/17/2024 18:33:00  Symptoms: left side numbness.  Initial patient interaction: 11/17/2024 18:37:00  NIHSS Assessment Completed: 11/17/2024 18:41:27  Patient is not a candidate for Thrombolytic.  Thrombolytic Medical Decision: 11/17/2024 18:42:49  Patient was not deemed candidate for Thrombolytic because of following reasons:  Stroke severity too mild (non-disabling) .    CT head showed no acute hemorrhage or acute core infarct.    ED Physician not notified of diagnostic impression and management plan because no answer        ------------------------------------------------------------------------------    History of Present Illness:  Patient is a 58 year old Male.    Patient was brought by private transportation with symptoms of left side numbness.  This is a 58-year-old gentleman with reported no past medical history, however does not follow with physicians and is not on the medication. He presents as a stroke alert after he experienced some left-sided numbness while visiting his wife in the hospital who was admitted with a stroke. He reports that roughly 2 hours ago he started experiencing right-sided temporal headache and along with a headache was some numbness and tingling in his left face and left arm. He denies any weakness, speech changes or any other deficits. He denies any prior similar symptoms. His blood pressure was markedly elevated to 200s over 90s, and 190/160 on recheck. He is not on any medications. Currently has symptoms are limited to mild numbness in his left side.      Past Medical History:       Hypertension    Medications:    No Anticoagulant use   No Antiplatelet use  Reviewed EMR for current medications    Allergies:   Reviewed    Social History:  Patient Is:   Drug Use: No    Family History:    There is no family history of premature cerebrovascular disease pertinent to this consultation    ROS :  14 Points Review  of Systems was performed and was negative except mentioned in HPI.    Past Surgical History:  There Is No Surgical History Contributory To Today’s Visit        Examination:  BP(190/160), Pulse(72), Blood Glucose(113)  1A: Level of Consciousness - Alert; keenly responsive + 0  1B: Ask Month and Age - Both Questions Right + 0  1C: Blink Eyes & Squeeze Hands - Performs Both Tasks + 0  2: Test Horizontal Extraocular Movements - Normal + 0  3: Test Visual Fields - No Visual Loss + 0  4: Test Facial Palsy (Use Grimace if Obtunded) - Normal symmetry + 0  5A: Test Left Arm Motor Drift - No Drift for 10 Seconds + 0  5B: Test Right Arm Motor Drift - No Drift for 10 Seconds + 0  6A: Test Left Leg Motor Drift - No Drift for 5 Seconds + 0  6B: Test Right Leg Motor Drift - No Drift for 5 Seconds + 0  7: Test Limb Ataxia (FNF/Heel-Shin) - No Ataxia + 0  8: Test Sensation - Mild-Moderate Loss: Less Sharp/More Dull + 1  9: Test Language/Aphasia - Normal; No aphasia + 0  10: Test Dysarthria - Normal + 0  11: Test Extinction/Inattention - No abnormality + 0    NIHSS Score: 1      Pre-Morbid Modified [place] Scale:  0 Points = No symptoms at all    I reviewed the available imaging via Rapid and initiated discussion with the primary provider    This consult was conducted in real time using interactive audio and video technology. Patient was informed of the technology being used for this visit and agreed to proceed. Patient located in hospital and provider located at home/office setting.      Patient is being evaluated for possible acute neurologic impairment and high probability of imminent or life-threatening deterioration. I spent total of 35 minutes providing care to this patient, including time for face to face visit via telemedicine, review of medical records, imaging studies and discussion of findings with providers, the patient and/or family.      Dr Lincoln Martinez      [facility]  For Inpatient follow-up with [facility]  physician please call [facility] at [phone]. As we are not an outpatient service for any post hospital discharge needs please contact the hospital for assistance.  If you have any questions for the [facility] physicians or need to reconsult for clinical or diagnostic changes please contact us via [facility] at [phone].

## 2024-11-18 NOTE — H&P
[facility] HISTORY AND PHYSICAL  Date: 2024   Patient Name: Rosalio Restrepo  : 1965  MRN: [id number]  Primary Care Physician:  Provider, No Known  Date of admission: 2024    Subjective   Subjective     Chief Complaint: left side numbness and headache    HPI:    Rosalio Restrepo is a 58 y.o. male with no past medical history presents to the ED due to headaches and left-sided numbness.  Patient states that since 5 PM earlier today she complained of left-sided numbness and headaches.  Patient denies any history of strokelike symptoms.  Denies chest pain, shortness of breath, blurry vision, nausea, vomiting, diarrhea, Naveed pain and fever.  In the ED, patient's vitals showed temperature 98.9, heart rate 64 and blood pressure 156/88.  Labs show troponin 22, glucose 100, creatinine 2.16, INR 1.03, Mobisyl 5.5.  CT head shows no acute findings.  Chest x-ray shows no acute findings.  Teleneurology was consulted.  No thrombolytics given.  Patient admitted to floors for further management.    Personal History     Past Medical History:  History reviewed. No pertinent past medical history.    Past Surgical History:  History reviewed. No pertinent surgical history.    Family History:   History reviewed. No pertinent family history.    Social History:   Social History     Socioeconomic History    Marital status:    Tobacco Use    Smoking status: Never   Substance and Sexual Activity    Alcohol use: Never    Drug use: Never    Sexual activity: Defer       Home Medications:       Allergies:  No Known Allergies    Review of Systems   All systems were reviewed and negative except for: Above    Objective   Objective     Vitals:   Temp:  [98.9 °F (37.2 °C)] 98.9 °F (37.2 °C)  Heart Rate:  [62-74] 64  Resp:  [18] 18  BP: (126-199)/() 156/88    Physical Exam    Constitutional: Awake, alert, no acute distress   Eyes: Pupils equal, sclerae anicteric, no conjunctival injection   HENT: NCAT, mucous  membranes moist   Neck: Supple, no thyromegaly, no lymphadenopathy, trachea midline   Respiratory: Clear to auscultation bilaterally, nonlabored respirations    Cardiovascular: RRR, no murmurs, rubs, or gallops, palpable pedal pulses bilaterally   Gastrointestinal: Positive bowel sounds, soft, nontender, nondistended   Musculoskeletal: No bilateral ankle edema, no clubbing or cyanosis to extremities   Psychiatric: Appropriate affect, cooperative   Neurologic: Oriented x 3, strength symmetric in all extremities, Cranial Nerves grossly intact to confrontation, speech clear   Skin: No rashes     Result Review    Result Review:  I have personally reviewed the results from the time of this admission to 11/17/2024 21:07 EST and agree with these findings:  [x]  Laboratory  []  Microbiology  [x]  Radiology  []  EKG/Telemetry   []  Cardiology/Vascular   []  Pathology  []  Old records  [x]  Other:      Assessment & Plan   Assessment / Plan     Assessment/Plan:     Assessment  Left-sided numbness with headache  Hypertensive urgency  CHARLES versus CKD    Plan  Admit to MedSurg  Telemetry  Neurochecks Q4  CBC BMP  TSH, hemoglobin C, lipid panel  Chest x-ray reviewed  Echo ordered  CT brain reviewed  MRI brain ordered  Renal ultrasound ordered  Carotid ultrasound ordered  Start aspirin 325  Allow permissive hypertension with systolic BP below 220    VTE Prophylaxis:  Mechanical VTE prophylaxis orders are present.        CODE STATUS:    Code Status (Patient has no pulse and is not breathing): CPR (Attempt to Resuscitate)  Medical Interventions (Patient has pulse or is breathing): Full Support      Admission Status:  I believe this patient meets obs status.    Electronically signed by Kashif Zimmer MD, 11/17/24, 9:07 PM EST.

## 2024-11-18 NOTE — H&P
[facility]   Consult Note    Patient Name: Rosalio Restrepo  : 1965  MRN: [id number]  Primary Care Physician:  Provider, No Known  Referring Physician: No ref. provider found  Date of admission: 2024    Subjective   Subjective     Reason for Consult/ Chief Complaint: Chronic kidney disease    HPI:  Rosalio Restrepo is a 58 y.o. male who has no known past medical history was admitted symptoms consistent with TIA/stroke.  CT head was negative patient has been hypertensive I was consulted because patient's creatinine is 1.9.  According to patient he has not seen any family doctor or any other healthcare provider for long time.  He denies lower extremity swelling he does not know if he has a hypertension and he does not take any medicines    Review of Systems  All review of systems negative except as given below.    Personal History     History reviewed. No pertinent past medical history.    History reviewed. No pertinent surgical history.    Family History: family history is not on file. Otherwise pertinent FHx was reviewed and not pertinent to current issue.    Social History:  reports that he has never smoked. He does not have any smokeless tobacco history on file. He reports that he does not drink alcohol and does not use drugs.    Home Medications:       Allergies:  No Known Allergies    Objective    Objective     Vitals:   Temp:  [97.9 °F (36.6 °C)-98.8 °F (37.1 °C)] 98.1 °F (36.7 °C)  Heart Rate:  [62-72] 62  Resp:  [18-20] 18  BP: (148-183)/() 148/106    Physical Exam:             Constitutional:         Awake, alert responsive, conversant, no obvious distress   Eyes:                       PERRLA, sclerae anicteric, no conjunctival injection   HEENT:                   Moist mucous membranes, no nasal or eye discharge, no throat congestion   Neck:                      Supple, no thyromegaly, no lymphadenopathy, trachea midline, no elevated JVD   Respiratory:           Clear to auscultation  bilaterally, nonlabored respirations    Cardiovascular:     RRR, no murmurs, rubs, or gallops, palpable pedal pulses bilaterally, No bilateral ankle edema   Gastrointestinal:   Positive bowel sounds, soft, nontender, non-distended, no organomegaly   Musculoskeletal:  No clubbing or cyanosis to extremities, muscle wasting, joint swelling, muscle weakness   Psychiatric:              Appropriate affect, cooperative   Neurologic:            Awake alert, oriented x 3, strength symmetric in all extremities, Cranial Nerves grossly intact to confrontation, speech clear   Skin:                      No rashes, bruising, skin ulcers, petechiae or ecchymosis    Result Review    Result Review:  I have personally reviewed the results from the time of this admission to 11/19/2024 07:50 EST and agree with these findings:  []  Laboratory  []  Microbiology  []  Radiology  []  EKG/Telemetry   []  Cardiology/Vascular   []  Pathology  []  Old records  []  Other:    Results from last 7 days   Lab Units 11/19/24  0405 11/17/24  1820   WBC 10*3/mm3 4.45 5.55   HEMOGLOBIN g/dL 14.5 15.5   PLATELETS 10*3/mm3 246 281     Results from last 7 days   Lab Units 11/19/24  0405 11/18/24  1629 11/17/24  1820   SODIUM mmol/L 139 142 142   POTASSIUM mmol/L 4.0 4.4 4.3   CHLORIDE mmol/L 106 108* 106   CO2 mmol/L 23.0 23.7 25.7   ANION GAP mmol/L 10.0 10.3 10.3   BUN mg/dL 21* 22* 22*   CREATININE mg/dL 1.92* 1.97* 2.16*   GLUCOSE mg/dL 89 91 100*       Assessment & Plan   Assessment / Plan     Active Hospital Problems:  Active Hospital Problems    Diagnosis     **Stroke     Essential hypertension     CKD (chronic kidney disease), stage III        Plan:   Will be careful to reduce the blood pressure on the face of suspected stroke however slowly and gradually we will get the patient blood pressure under control if he follows up    Electronically signed by Barby Rios MD, 11/18/24, 4:37 PM CST.

## 2024-11-18 NOTE — CASE MANAGEMENT/SOCIAL WORK
Discharge Planning Assessment   Arlin     Patient Name: Rosalio Restrepo  MRN: [id number]  Today's Date: 11/18/2024    Admit Date: 11/17/2024    Plan: Pt lives at home with wife. Pt wife also in the hospital with similar concerns. Pt is self-pay, Pt works outside of home Self employed. Pt is open to [facility] healthcare set up for continued care at discharge. Pt denies concerns with transportation or affording groceries and utilities. Pt independent at home and has good support from family. PT: home. SW will continue to follow for discharge needs.   Discharge Needs Assessment       Row Name 11/18/24 1140       Living Environment    People in Home spouse    Current Living Arrangements home    Potentially Unsafe Housing Conditions none    In the past 12 months has the electric, gas, oil, or water company threatened to shut off services in your home? No    Primary Care Provided by self    Provides Primary Care For no one    Family Caregiver if Needed child(katiana), adult;spouse    Quality of Family Relationships helpful;involved;supportive    Able to Return to Prior Arrangements yes       Resource/Environmental Concerns    Resource/Environmental Concerns none    Transportation Concerns none       Transportation Needs    In the past 12 months, has lack of transportation kept you from medical appointments or from getting medications? no    In the past 12 months, has lack of transportation kept you from meetings, work, or from getting things needed for daily living? No       Food Insecurity    Within the past 12 months, you worried that your food would run out before you got the money to buy more. Never true    Within the past 12 months, the food you bought just didn't last and you didn't have money to get more. Never true       Transition Planning    Patient/Family Anticipates Transition to home with family    Patient/Family Anticipated Services at Transition none    Transportation Anticipated car, drives self;family or friend  will provide       Discharge Needs Assessment    Readmission Within the Last 30 Days no previous admission in last 30 days    Equipment Currently Used at Home none    Concerns to be Addressed discharge planning    Do you want help finding or keeping work or a job? I do not need or want help    Do you want help with school or training? For example, starting or completing job training or getting a high school diploma, GED or equivalent No    Anticipated Changes Related to Illness none    Equipment Needed After Discharge none    Discharge Coordination/Progress Pt lives at home with wife. Pt wife also in the hospital with similar concerns. Pt is self-pay, Pt works outside of home Self employed. [facility] for continued care at discharge. Pt denies concerns with transportation or affording groceries and utilities. Pt independent at home and has good support from family. PT: home. SW will continue to follow for discharge needs.                   Discharge Plan       Row Name 11/18/24 1145       Plan    Plan Pt lives at home with wife. Pt wife also in the hospital with similar concerns. Pt is self-pay, Pt works outside of home Self employed. Pt is open [facility] continued care at discharge. Pt denies concerns with transportation or affording groceries and utilities. Pt independent at home and has good support from family. PT: home. SW will continue to follow for discharge needs.                  Continued Care and Services - Admitted Since 11/17/2024    No active coordination exists for this encounter.          Demographic Summary       Row Name 11/18/24 1129       General Information    Admission Type inpatient    Arrived From emergency department    Referral Source admission list    Reason for Consult discharge planning    Preferred Language English       Contact Information    Permission Granted to Share Info With permission denied                   Functional Status        Row Name 11/18/24 1139       Functional Status    Usual Activity Tolerance good    Current Activity Tolerance good       Physical Activity    On average, how many minutes do you engage in exercise at this level? 0 min       Assessment of Health Literacy    How often do you have someone help you read hospital materials? Never    How often do you have problems learning about your medical condition because of difficulty understanding written information? Never    How often do you have a problem understanding what is told to you about your medical condition? Never    How confident are you filling out medical forms by yourself? Quite a bit    Health Literacy Good       Functional Status, IADL    Medications independent    Meal Preparation independent    Housekeeping independent    Laundry independent    Shopping independent    If for any reason you need help with day-to-day activities such as bathing, preparing meals, shopping, managing finances, etc., do you get the help you need? I get all the help I need       Mental Status    General Appearance WDL WDL       Mental Status Summary    Recent Changes in Mental Status/Cognitive Functioning no changes       Employment/    Employment Status self-employed                   Psychosocial    No documentation.                  Abuse/Neglect    No documentation.                  Legal       Row Name 11/18/24 1140       Financial Resource Strain    How hard is it for you to pay for the very basics like food, housing, medical care, and heating? Not hard       Financial/Legal    Source of Income salary/wages    Application for Public Assistance not applied       Legal    Criminal Activity/Legal Involvement none                   Substance Abuse    No documentation.                  Patient Forms    No documentation.                     Crystal Levi

## 2024-11-18 NOTE — PLAN OF CARE
Goal Outcome Evaluation:  Plan of Care Reviewed With: patient, spouse        Progress: improving  Outcome Evaluation: BP improving slowly. NIH 0. Medicated for headache per MAR.

## 2024-11-18 NOTE — PROGRESS NOTES
[facility]   Hospitalist Progress Note  Date: 2024  Patient Name: Rosalio Restrepo  : 1965  MRN: [id number]  Date of admission: 2024      Subjective   Subjective     Chief complaint: Left-sided numbness and headache    Summary:  58-year-old male with no notable past medical history, hospitalized on 2024 for chief complaint of left-sided numbness and headache, acute onset, concerning for strokelike symptoms, presented to the emergency room, found to have impaired renal function, teleneuro consulted, concerning for stroke, initial CT scan negative, MRI pursued, confirmed acute stroke within the right centrum semiovale, with chronic microvascular ischemic changes, MRI angio indicative of poor flow in the right MCA trifurcation with possible occlusion of the proximal anterior right M2 MCA branch, CT angios ordered and pending, carotid Dopplers came back normal.  Stroke protocol initiated and followed.    Interval follow-up: Patient seen and examined this morning, no acute distress, no acute major overnight events, patient remains weak and fatigued, still has a mild headache, blood pressures remain markedly uncontrolled, but better, permissive hypertension in the setting of stroke, correcting only if over 220/120.  After 48 hours will correct slower.  MRI came back positive for stroke after rounds.  Further imaging ordered.  The underlying issue with further imaging is his impaired renal function, creatinine 2.16, this will need to be trended before we pursue any further imaging with contrast.  Will reach out to nephrology for significant proteinuria seen on urinalysis.  Telemetry reviewed, no acute major rhythmic events, sinus rhythm in the 70s.  Echo ordered and pending.  No new focal neurological changes    Review of systems:  All systems reviewed and negative except for weakness, fatigue, left facial numbness      Objective   Objective     Vitals:   Temp:  [98.1 °F (36.7 °C)-98.9 °F (37.2  °C)] 98.6 °F (37 °C)  Heart Rate:  [62-74] 63  Resp:  [18-20] 18  BP: (126-207)/() 166/104  Physical Exam               Constitutional: Awake, alert, no acute distress              Eyes: Pupils equal, sclerae anicteric, no conjunctival injection              HENT: NCAT, mucous membranes moist              Neck: Supple, no thyromegaly, no lymphadenopathy, trachea midline              Respiratory: Clear to auscultation bilaterally, nonlabored respirations               Cardiovascular: RRR, no murmurs, rubs, or gallops, palpable pedal pulses bilaterally              Gastrointestinal: Positive bowel sounds, soft, nontender, nondistended              Musculoskeletal: No bilateral ankle edema, no clubbing or cyanosis to extremities              Psychiatric: Appropriate affect, cooperative              Neurologic: Oriented x 3, strength symmetric in all extremities, Cranial Nerves grossly intact to confrontation, speech clear              Skin: No rashes     Result Review    Result Review:  I have personally reviewed the pertinent results from the past 24 hours to 11/18/2024 16:34 EST and agree with these findings:  [x]  Laboratory   CBC          11/17/2024    18:20   CBC   WBC 5.55    RBC 5.33    Hemoglobin 15.5    Hematocrit 47.4    MCV 88.9    MCH 29.1    MCHC 32.7    RDW 12.5    Platelets 281      BMP          11/17/2024    18:20   BMP   BUN 22    Creatinine 2.16    Sodium 142    Potassium 4.3    Chloride 106    CO2 25.7    Calcium 9.2      LIVER FUNCTION TESTS:      Lab 11/17/24  1820   TOTAL PROTEIN 7.6   ALBUMIN 4.2   GLOBULIN 3.4   ALT (SGPT) 26   AST (SGOT) 23   BILIRUBIN 0.4   ALK PHOS 95       [x]  Microbiology   Microbiology Results (last 10 days)       ** No results found for the last 240 hours. **              [x]  Radiology MRI Angiogram Head Without Contrast    Result Date: 11/18/2024  Impression: Examination is limited due to motion artifact. Poor flow signal seen about the right MCA trifurcation  with possible occlusion of a proximal anterior right M2 MCA branch vessel (see series 2, image 69). Recommend further evaluation with CT angiogram The above findings were discussed with Sherif Bowden via telephone on 11/18/2024 9:08 AM EST. Electronically Signed: Chaparro Funez MD  11/18/2024 9:10 AM EST  Workstation ID: [id number]    MRI Brain Without Contrast    Result Date: 11/18/2024  Impression: 1. Restricted diffusion within the right centrum semiovale consistent with acute infarct. 2. Moderate chronic microvascular ischemic changes. These results were called to the patient's nurse on 11/18/2024 at 9:07 a.m. Eastern standard time. Electronically Signed: Bernadine Aguila MD  11/18/2024 9:07 AM EST  Workstation ID: [id number]    US Renal Bilateral    Result Date: 11/18/2024   1. No hydronephrosis is seen bilaterally.  2. No definite nephrolithiasis.  3. No suspicious renal masses are seen.  4. The renal parenchymal is abnormally increased bilaterally, suggesting chronic medical renal disease.  5. No renal cortical scarring is suggested.  6. Grossly, no definite focal abnormality of the urinary bladder.  7. Please see above comments for further detail.    Portions of this note were completed with a voice recognition program.  Electronically Signed ByDakotah Hernández MD On:11/18/2024 2:10 AM      XR Chest 1 View    Result Date: 11/17/2024  No acute infiltrate is appreciated. No cardiac enlargement.    Portions of this note were completed with a voice recognition program.  Electronically Signed By-Stalin Hernández MD On:11/17/2024 7:05 PM      CT Head Without Contrast Stroke Protocol    Result Date: 11/17/2024  Impression: No evidence of acute intracranial abnormality. Electronically Signed: Jt Schwartz MD  11/17/2024 6:36 PM EST  Workstation ID: [id number]       []  EKG/Telemetry   ECG 12 Lead ED Triage Standing Order; Acute Stroke (Onset <12 hrs)   Preliminary Result   HEART RATE=67  bpm   RR Evhbfkoe=364  ms   NH  Pyhmzgfg=714  ms   P Horizontal Axis=-11  deg   P Front Axis=41  deg   QRSD Interval=90  ms   QT Ripvqhgk=159  ms   JGxG=369  ms   QRS Axis=20  deg   T Wave Axis=141  deg   - ABNORMAL ECG -   Sinus rhythm   Consider left ventricular hypertrophy   Nonspecific T abnormalities, lateral leads   ST elevation, consider anterior injury   Date and Time of Study:2024-11-17 18:53:02          []  Cardiology/Vascular   []  Pathology  [x]  Old records  []  Other:    Assessment & Plan   Assessment / Plan     Assessment/Plan:  Assessment:  Acute stroke likely ischemic of the right centrum semiovale  Concern for occlusion of proximal anterior right M2 MCA branch artery  Likely CKD, stage IIIa with impaired renal function and significant proteinuria  Uncontrolled hypertension  Hypertensive crisis    Plan:  Labs and imaging reviewed  Follow stroke protocol  PT/OT  Diet per speech  High intensity statin  Aspirin 81 mg daily  Start Plavix 75 mg daily  Nephrology consulted discussed with Dr. Rios, patient with underlying CKD likely new diagnosis for him secondary to uncontrolled hypertension with significant proteinuria  Renal ultrasound performed, showing chronic medical renal disease  Slow correction of blood pressure, correct only if systolic greater than 220 and diastolics greater than 120, after 48 hours then pursue further correction; utilize labetalol or hydralazine  CTA head and neck ordered to evaluate further occlusion of anterior right M2 MCA branch artery  Continue telemetry monitor  Follow-up echo  A.m. labs  Full code  DVT prophylaxis with SCDs  Clinical course to dictate further management  Discussed with nurse at the bedside    VTE Prophylaxis:  Mechanical VTE prophylaxis orders are present.        CODE STATUS:   Code Status (Patient has no pulse and is not breathing): CPR (Attempt to Resuscitate)  Medical Interventions (Patient has pulse or is breathing): Full Support        Electronically signed by Gatito Leonard  MD, 11/18/2024, 16:34 EST.    Portions of this documentation were transcribed electronically from a voice recognition software.  I confirm all data accurately represents the service(s) I performed at today's visit.

## 2024-11-18 NOTE — SIGNIFICANT NOTE
11/18/24 1048   OTHER   Discipline occupational therapist   Rehab Time/Intention   Session Not Performed patient unavailable for evaluation  (patient is off unit x2 attemps in non-invasive vascular)

## 2024-11-18 NOTE — SIGNIFICANT NOTE
11/18/24 1116   OTHER   Discipline speech language pathologist   Rehab Time/Intention   Session Not Performed patient unavailable for evaluation  (in procedure)   Recommendations   SLP - Next Appointment 11/19/24

## 2024-11-18 NOTE — PLAN OF CARE
Goal Outcome Evaluation:  Plan of Care Reviewed With: (P) patient           Outcome Evaluation: (P) Pt presents with strength, mobility, and ambulation abilities consistent with baseline abilities. Pt ambulated 200' with no increwases in symptoms. Skilled PT is not indicated at this time.    Anticipated Discharge Disposition (PT): (P) home

## 2024-11-18 NOTE — PLAN OF CARE
Goal Outcome Evaluation:   Pt admitted for a possible stroke. No acute events this shift.

## 2024-11-18 NOTE — THERAPY EVALUATION
Acute Care - Physical Therapy Initial Evaluation   [facility]     Patient Name: Rosalio Restrepo  : 1965  MRN: [id number]  Today's Date: 2024      Visit Dx:     ICD-10-CM ICD-9-CM   1. Left sided numbness  R20.0 782.0   2. Stroke-like symptoms  R29.90 781.99   3. Elevated serum creatinine  R79.89 790.99   4. Severe hypertension  I10 401.9     Patient Active Problem List   Diagnosis    Stroke     History reviewed. No pertinent past medical history.  History reviewed. No pertinent surgical history.  PT Assessment (Last 12 Hours)       PT Evaluation and Treatment       Row Name 24 1108          Physical Therapy Time and Intention    Subjective Information complains of (P)   headache  -IF     Document Type evaluation (P)   -IF     Mode of Treatment individual therapy;physical therapy (P)   -IF     Patient Effort good (P)   -IF       Row Name 24 110          General Information    Patient Profile Reviewed yes (P)   -IF     Patient Observations alert;cooperative;agree to therapy (P)   -IF     Prior Level of Function independent:;all household mobility;transfer;gait;bed mobility;ADL's (P)   -IF     Equipment Currently Used at Home none (P)   -IF       Row Name 24 110          Living Environment    Current Living Arrangements home (P)   -IF     Home Accessibility stairs to enter home;stairs within home (P)   -IF     People in Home spouse (P)   -IF     Primary Care Provided by self (P)   -IF       Row Name 24 110          Home Main Entrance    Number of Stairs, Main Entrance eight (P)   -IF       [facility] Name 24 1108          Stairs Within Home, Primary    Number of Stairs, Within Home, Primary eight (P)   -IF       [facility] Name 24 110          Home Use of Assistive/Adaptive Equipment    Equipment Currently Used at Home none (P)   -IF       Row Name 24 110          Cognition    Orientation Status (Cognition) oriented x 4 (P)   -IF       [facility] Name 24 110          Range of  Motion (ROM)    Range of Motion ROM is WFL;bilateral lower extremities (P)   -IF       Row Name 11/18/24 1108          Strength (Manual Muscle Testing)    Strength (Manual Muscle Testing) bilateral lower extremities (P)   BLE: 5/5  -IF       Row Name 11/18/24 1108          Bed Mobility    Comment, (Bed Mobility) not tested pt was sitting eob upon arrival (P)   -IF       Row Name 11/18/24 1108          Transfers    Transfers sit-stand transfer;stand-sit transfer (P)   -IF     Maintains Weight-bearing Status (Transfers) able to maintain (P)   -IF       Row Name 11/18/24 1108          Sit-Stand Transfer    Sit-Stand Beadle (Transfers) supervision (P)   -IF       Row Name 11/18/24 1108          Stand-Sit Transfer    Stand-Sit Beadle (Transfers) supervision (P)   -IF       Row Name 11/18/24 1108          Gait/Stairs (Locomotion)    Gait/Stairs Locomotion gait/ambulation independence (P)   -IF     Beadle Level (Gait) standby assist (P)   -IF     Patient was able to Ambulate yes (P)   -IF     Distance in Feet (Gait) 200 (P)   -IF       Row Name 11/18/24 1108          Balance    Balance Assessment standing dynamic balance (P)   -IF     Dynamic Standing Balance standby assist (P)   -IF     Position/Device Used, Standing Balance unsupported (P)   -IF       Row Name 11/18/24 1108          Plan of Care Review    Plan of Care Reviewed With patient (P)   -IF     Outcome Evaluation Pt presents with strength, mobility, and ambulation abilities consistent with baseline abilities. Pt ambulated 200' with no increwases in symptoms. Skilled PT is not indicated at this time. (P)   -IF       Row Name 11/18/24 1108          Positioning and Restraints    Pre-Treatment Position in bed (P)   -IF     Post Treatment Position bed (P)   -IF     In Bed sitting EOB;call light within reach;encouraged to call for assist;exit alarm on;with family/caregiver (P)   -IF       Row Name 11/18/24 1108          Therapy Assessment/Plan (PT)     Criteria for Skilled Interventions Met (PT) no problems identified which require skilled intervention (P)   -IF     Therapy Frequency (PT) evaluation only (P)   -IF       Row Name 11/18/24 1108          PT Evaluation Complexity    History, PT Evaluation Complexity no personal factors and/or comorbidities (P)   -IF     Examination of Body Systems (PT Eval Complexity) total of 4 or more elements (P)   -IF     Clinical Presentation (PT Evaluation Complexity) stable (P)   -IF     Clinical Decision Making (PT Evaluation Complexity) low complexity (P)   -IF     Overall Complexity (PT Evaluation Complexity) low complexity (P)   -IF       Row Name 11/18/24 1108          Therapy Plan Review/Discharge Plan (PT)    Therapy Plan Review (PT) evaluation/treatment results reviewed;patient;spouse/significant other (P)   -IF       Row Name 11/18/24 1108          Physical Therapy Goals    Problem Specific Goal Selection (PT) problem specific goal 1, PT (P)   -IF       Row Name 11/18/24 1108          Problem Specific Goal 1 (PT)    Problem Specific Goal 1 (PT) complete evaluation (P)   -IF     Time Frame (Problem Specific Goal 1, PT) 1 day (P)   -IF     Progress/Outcome (Problem Specific Goal 1, PT) goal met (P)   -IF               User Key  (r) = Recorded By, (t) = Taken By, (c) = Cosigned By      Initials Name Provider Type    IF Rocio Sullivan, PT Student PT Student                    Physical Therapy Education        No education to display                  PT Recommendation and Plan  Anticipated Discharge Disposition (PT): (P) home  Therapy Frequency (PT): (P) evaluation only  Plan of Care Reviewed With: (P) patient  Outcome Evaluation: (P) Pt presents with strength, mobility, and ambulation abilities consistent with baseline abilities. Pt ambulated 200' with no increwases in symptoms. Skilled PT is not indicated at this time.   Outcome Measures       Row Name 11/18/24 1117             How much help from another person do  you currently need...    Turning from your back to your side while in flat bed without using bedrails? 4 (P)   -IF      Moving from lying on back to sitting on the side of a flat bed without bedrails? 4 (P)   -IF      Moving to and from a bed to a chair (including a wheelchair)? 4 (P)   -IF      Standing up from a chair using your arms (e.g., wheelchair, bedside chair)? 4 (P)   -IF      Climbing 3-5 steps with a railing? 4 (P)   -IF      To walk in hospital room? 4 (P)   -IF      AM-PAC 6 Clicks Score (PT) 24 (P)   -IF                User Key  (r) = Recorded By, (t) = Taken By, (c) = Cosigned By      Initials Name Provider Type    IF Rocio Sullivan PT Student PT Student                     Time Calculation:    PT Charges       Row Name 11/18/24 1117             Time Calculation    PT Received On 11/18/24 (P)   -IF         Untimed Charges    PT Eval/Re-eval Minutes 30 (P)   -IF         Total Minutes    Untimed Charges Total Minutes 30 (P)   -IF       Total Minutes 30 (P)   -IF                User Key  (r) = Recorded By, (t) = Taken By, (c) = Cosigned By      Initials Name Provider Type    Rocio Matt PT Student PT Student                  Therapy Charges for Today       Code Description Service Date Service Provider Modifiers Qty    28132851817  PT EVAL LOW COMPLEXITY 2 11/18/2024 Rocio Sullivan PT Student GP 1            PT G-Codes  AM-PAC 6 Clicks Score (PT): (P) 24    ISH Colindres  11/18/2024

## 2024-11-19 PROBLEM — N18.30 CKD (CHRONIC KIDNEY DISEASE), STAGE III: Status: ACTIVE | Noted: 2024-11-19

## 2024-11-19 PROBLEM — I10 ESSENTIAL HYPERTENSION: Status: ACTIVE | Noted: 2024-11-19

## 2024-11-19 LAB
ALBUMIN SERPL-MCNC: 3.6 G/DL (ref 3.5–5.2)
ALP SERPL-CCNC: 85 U/L (ref 39–117)
ALT SERPL W P-5'-P-CCNC: 21 U/L (ref 1–41)
ANION GAP SERPL CALCULATED.3IONS-SCNC: 10 MMOL/L (ref 5–15)
ASCENDING AORTA: 3.8 CM
AST SERPL-CCNC: 19 U/L (ref 1–40)
BASOPHILS # BLD AUTO: 0.03 10*3/MM3 (ref 0–0.2)
BASOPHILS NFR BLD AUTO: 0.7 % (ref 0–1.5)
BH CV ECHO MEAS - AO MAX PG: 8.5 MMHG
BH CV ECHO MEAS - AO MEAN PG: 4.3 MMHG
BH CV ECHO MEAS - AO V2 MAX: 146 CM/SEC
BH CV ECHO MEAS - AO V2 VTI: 25.8 CM
BH CV ECHO MEAS - EDV(MOD-SP2): 136 ML
BH CV ECHO MEAS - EDV(MOD-SP4): 136.6 ML
BH CV ECHO MEAS - EF(MOD-SP2): 62.4 %
BH CV ECHO MEAS - EF(MOD-SP4): 64.6 %
BH CV ECHO MEAS - ESV(MOD-SP2): 51.1 ML
BH CV ECHO MEAS - ESV(MOD-SP4): 48.4 ML
BH CV ECHO MEAS - IVS/LVPW: 1 CM
BH CV ECHO MEAS - IVSD: 1.6 CM
BH CV ECHO MEAS - LA DIMENSION: 4 CM
BH CV ECHO MEAS - LAT PEAK E' VEL: 4.9 CM/SEC
BH CV ECHO MEAS - LV MAX PG: 5.8 MMHG
BH CV ECHO MEAS - LV MEAN PG: 2.2 MMHG
BH CV ECHO MEAS - LV V1 MAX: 120 CM/SEC
BH CV ECHO MEAS - LV V1 VTI: 23.3 CM
BH CV ECHO MEAS - LVIDD: 4.4 CM
BH CV ECHO MEAS - LVIDS: 2.2 CM
BH CV ECHO MEAS - LVOT DIAM: 2.2 CM
BH CV ECHO MEAS - LVPWD: 1.6 CM
BH CV ECHO MEAS - MED PEAK E' VEL: 5.3 CM/SEC
BH CV ECHO MEAS - MV A MAX VEL: 88 CM/SEC
BH CV ECHO MEAS - MV E MAX VEL: 68.5 CM/SEC
BH CV ECHO MEAS - MV E/A: 0.78
BH CV ECHO MEAS - SV(MOD-SP2): 84.9 ML
BH CV ECHO MEAS - SV(MOD-SP4): 88.2 ML
BH CV ECHO MEAS - TAPSE (>1.6): 2.7 CM
BH CV ECHO MEASUREMENTS AVERAGE E/E' RATIO: 13.43
BH CV XLRA - RV BASE: 3.5 CM
BH CV XLRA - TDI S': 17.3 CM/SEC
BILIRUB CONJ SERPL-MCNC: 0.1 MG/DL (ref 0–0.3)
BILIRUB INDIRECT SERPL-MCNC: 0.3 MG/DL
BILIRUB SERPL-MCNC: 0.4 MG/DL (ref 0–1.2)
BUN SERPL-MCNC: 21 MG/DL (ref 6–20)
BUN/CREAT SERPL: 10.9 (ref 7–25)
CALCIUM SPEC-SCNC: 8.8 MG/DL (ref 8.6–10.5)
CHLORIDE SERPL-SCNC: 106 MMOL/L (ref 98–107)
CO2 SERPL-SCNC: 23 MMOL/L (ref 22–29)
CREAT SERPL-MCNC: 1.92 MG/DL (ref 0.76–1.27)
DEPRECATED RDW RBC AUTO: 40.3 FL (ref 37–54)
EGFRCR SERPLBLD CKD-EPI 2021: 39.6 ML/MIN/1.73
EOSINOPHIL # BLD AUTO: 0.12 10*3/MM3 (ref 0–0.4)
EOSINOPHIL NFR BLD AUTO: 2.7 % (ref 0.3–6.2)
ERYTHROCYTE [DISTWIDTH] IN BLOOD BY AUTOMATED COUNT: 12.5 % (ref 12.3–15.4)
GLUCOSE SERPL-MCNC: 89 MG/DL (ref 65–99)
HCT VFR BLD AUTO: 45.1 % (ref 37.5–51)
HGB BLD-MCNC: 14.5 G/DL (ref 13–17.7)
IMM GRANULOCYTES # BLD AUTO: 0.02 10*3/MM3 (ref 0–0.05)
IMM GRANULOCYTES NFR BLD AUTO: 0.4 % (ref 0–0.5)
LEFT ATRIUM VOLUME INDEX: 23.4 ML/M2
LYMPHOCYTES # BLD AUTO: 1.17 10*3/MM3 (ref 0.7–3.1)
LYMPHOCYTES NFR BLD AUTO: 26.3 % (ref 19.6–45.3)
MAGNESIUM SERPL-MCNC: 2.2 MG/DL (ref 1.6–2.6)
MCH RBC QN AUTO: 28.6 PG (ref 26.6–33)
MCHC RBC AUTO-ENTMCNC: 32.2 G/DL (ref 31.5–35.7)
MCV RBC AUTO: 89 FL (ref 79–97)
MONOCYTES # BLD AUTO: 0.64 10*3/MM3 (ref 0.1–0.9)
MONOCYTES NFR BLD AUTO: 14.4 % (ref 5–12)
NEUTROPHILS NFR BLD AUTO: 2.47 10*3/MM3 (ref 1.7–7)
NEUTROPHILS NFR BLD AUTO: 55.5 % (ref 42.7–76)
NRBC BLD AUTO-RTO: 0 /100 WBC (ref 0–0.2)
PHOSPHATE SERPL-MCNC: 3 MG/DL (ref 2.5–4.5)
PLATELET # BLD AUTO: 246 10*3/MM3 (ref 140–450)
PMV BLD AUTO: 11.6 FL (ref 6–12)
POTASSIUM SERPL-SCNC: 4 MMOL/L (ref 3.5–5.2)
PROT SERPL-MCNC: 6.6 G/DL (ref 6–8.5)
RBC # BLD AUTO: 5.07 10*6/MM3 (ref 4.14–5.8)
SINUS: 3.1 CM
SODIUM SERPL-SCNC: 139 MMOL/L (ref 136–145)
WBC NRBC COR # BLD AUTO: 4.45 10*3/MM3 (ref 3.4–10.8)

## 2024-11-19 PROCEDURE — 80048 BASIC METABOLIC PNL TOTAL CA: CPT | Performed by: FAMILY MEDICINE

## 2024-11-19 PROCEDURE — 99231 SBSQ HOSP IP/OBS SF/LOW 25: CPT | Performed by: PSYCHIATRY & NEUROLOGY

## 2024-11-19 PROCEDURE — 85025 COMPLETE CBC W/AUTO DIFF WBC: CPT | Performed by: FAMILY MEDICINE

## 2024-11-19 PROCEDURE — 97165 OT EVAL LOW COMPLEX 30 MIN: CPT

## 2024-11-19 PROCEDURE — 83735 ASSAY OF MAGNESIUM: CPT | Performed by: FAMILY MEDICINE

## 2024-11-19 PROCEDURE — 84100 ASSAY OF PHOSPHORUS: CPT | Performed by: FAMILY MEDICINE

## 2024-11-19 PROCEDURE — 99232 SBSQ HOSP IP/OBS MODERATE 35: CPT | Performed by: INTERNAL MEDICINE

## 2024-11-19 PROCEDURE — 80076 HEPATIC FUNCTION PANEL: CPT | Performed by: FAMILY MEDICINE

## 2024-11-19 PROCEDURE — 92610 EVALUATE SWALLOWING FUNCTION: CPT

## 2024-11-19 RX ADMIN — Medication 10 ML: at 20:18

## 2024-11-19 RX ADMIN — CLOPIDOGREL BISULFATE 75 MG: 75 TABLET ORAL at 08:27

## 2024-11-19 RX ADMIN — ATORVASTATIN CALCIUM 40 MG: 40 TABLET, FILM COATED ORAL at 08:27

## 2024-11-19 RX ADMIN — Medication 10 ML: at 08:27

## 2024-11-19 RX ADMIN — ACETAMINOPHEN 650 MG: 325 TABLET ORAL at 08:04

## 2024-11-19 RX ADMIN — ASPIRIN 81 MG: 81 TABLET, COATED ORAL at 08:27

## 2024-11-19 NOTE — THERAPY EVALUATION
Patient Name: Rosalio Restrepo  : 1965    MRN: [id number]                              Today's Date: 2024       Admit Date: 2024    Visit Dx:     ICD-10-CM ICD-9-CM   1. Left sided numbness  R20.0 782.0   2. Stroke-like symptoms  R29.90 781.99   3. Elevated serum creatinine  R79.89 790.99   4. Severe hypertension  I10 401.9   5. Dysphagia, unspecified type  R13.10 787.20   6. Decreased activities of daily living (ADL)  Z78.9 V49.89     Patient Active Problem List   Diagnosis    Stroke    Essential hypertension    CKD (chronic kidney disease), stage III     History reviewed. No pertinent past medical history.  History reviewed. No pertinent surgical history.   General Information       Row Name 24 1116          OT Time and Intention    Subjective Information no complaints  -ES     Document Type evaluation  -ES     Mode of Treatment individual therapy;occupational therapy  -ES     Patient Effort good  -ES       Row Name 24 1116          General Information    Patient Profile Reviewed yes  -ES     Prior Level of Function independent:;ADL's;community mobility;all household mobility  Patient independent with B and IADLs at baseline. No device for functional mobility. Patient is employed. Tub/shower, standing shower completion. patient denies recent falls.  -ES     Existing Precautions/Restrictions no known precautions/restrictions  -ES     Barriers to Rehab none identified  -ES       Row Name 24 1116          Occupational Profile    Reason for Services/Referral (Occupational Profile) Patient is 58 yr old male admitted to [facility] on 2024 with reports of headache, left sided numbness and tingling. OT evaluation and treatment ordered d/t recent decline in ADLs/transfer ability and discharge planning recommendations. No previous OT services for current condition.  -ES       Row Name 24 1116          Living Environment    People in Home spouse  -ES       Row Name  11/19/24 1116          Home Main Entrance    Number of Stairs, Main Entrance eight  -ES       Row Name 11/19/24 1116          Stairs Within Home, Primary    Stairs, Within Home, Primary patients live in bi-level home  -ES       Row Name 11/19/24 1116          Cognition    Orientation Status (Cognition) oriented x 3  patient pleasant and cooperative, agreeable to therapy evaluation.  -ES               User Key  (r) = Recorded By, (t) = Taken By, (c) = Cosigned By      Initials Name Provider Type    ES Ana Tello, OTR/L, CSRS Occupational Therapist                     Mobility/ADL's       Row Name 11/19/24 1120          Bed Mobility    Bed Mobility bed mobility (all) activities  -ES     All Activities, Dante (Bed Mobility) not tested  -ES     Comment, (Bed Mobility) not tested. patient met seated in recliner at therapy arrival to room.  -ES       Row Name 11/19/24 1120          Transfers    Transfers sit-stand transfer;stand-sit transfer  -ES       Row Name 11/19/24 1120          Sit-Stand Transfer    Sit-Stand Dante (Transfers) independent  -ES     Assistive Device (Sit-Stand Transfers) other (see comments)  no assistive device  -ES       Row Name 11/19/24 1120          Stand-Sit Transfer    Stand-Sit Dante (Transfers) independent  -ES     Assistive Device (Stand-Sit Transfers) other (see comments)  no assistive device  -ES       Row Name 11/19/24 1120          Functional Mobility    Functional Mobility- Ind. Level independent  -ES     Functional Mobility- Device other (see comments)  no assistive device  -ES       Row Name 11/19/24 1120          Activities of Daily Living    BADL Assessment/Intervention bathing;upper body dressing;lower body dressing;grooming;feeding;toileting  -ES       Row Name 11/19/24 1120          Bathing Assessment/Intervention    Dante Level (Bathing) bathing skills;independent  -ES       Row Name 11/19/24 1120          Upper Body Dressing Assessment/Training     Stafford Level (Upper Body Dressing) upper body dressing skills;independent  -ES       Row Name 11/19/24 1120          Lower Body Dressing Assessment/Training    Stafford Level (Lower Body Dressing) lower body dressing skills;independent  -ES       Row Name 11/19/24 1120          Grooming Assessment/Training    Stafford Level (Grooming) grooming skills;independent  -ES       Row Name 11/19/24 1120          Self-Feeding Assessment/Training    Stafford Level (Feeding) feeding skills;independent  -ES       Row Name 11/19/24 1120          Toileting Assessment/Training    Stafford Level (Toileting) toileting skills;independent  -ES               User Key  (r) = Recorded By, (t) = Taken By, (c) = Cosigned By      Initials Name Provider Type    ES Ana Tello, OTR/L, CSRS Occupational Therapist                   Obj/Interventions       Row Name 11/19/24 1124          Sensory Assessment (Somatosensory)    Sensory Assessment (Somatosensory) sensation intact  -ES     Sensory Assessment bilateral upper extremity sensation intact to light and heavy touch.  -ES       Row Name 11/19/24 1124          Vision Assessment/Intervention    Visual Impairment/Limitations WFL  -ES     Vision Assessment Comment vision screen intact to all four visual quadrants with tracking of object  -ES       Row Name 11/19/24 1124          Range of Motion Comprehensive    General Range of Motion bilateral upper extremity ROM WNL  -ES       Row Name 11/19/24 1124          Strength Comprehensive (MMT)    General Manual Muscle Testing (MMT) Assessment no strength deficits identified  -ES     Comment, General Manual Muscle Testing (MMT) Assessment BUEs 5/5 with symmetrical strength  -ES       Row Name 11/19/24 1124          Motor Skills    Motor Skills functional endurance;coordination  -ES     Coordination WFL;bilateral;upper extremity;lower extremity  -ES     Functional Endurance good  -ES       Row Name 11/19/24 1124          Balance     Balance Assessment sitting dynamic balance;standing dynamic balance  -ES     Dynamic Sitting Balance independent  -ES     Position, Sitting Balance unsupported  -ES     Dynamic Standing Balance independent  -ES     Position/Device Used, Standing Balance unsupported  no assistive device  -ES               User Key  (r) = Recorded By, (t) = Taken By, (c) = Cosigned By      Initials Name Provider Type    Ana Pack, OTR/L, CSRS Occupational Therapist                   Goals/Plan    No documentation.                  Clinical Impression       Row Name 11/19/24 1125          Pain Assessment    Pretreatment Pain Rating 0/10 - no pain  -ES     Posttreatment Pain Rating 0/10 - no pain  -ES       Row Name 11/19/24 1125          Plan of Care Review    Plan of Care Reviewed With patient  -ES     Outcome Evaluation Patient presents at or near baseline functional status at time of evaluation with no identified functional deficits that impede patient independence with activities of daily living.  No indicated need for skilled occupational therapy intervention in the acute care setting.  Occupational therapy will sign off at this time.  -ES       Row Name 11/19/24 1125          Therapy Assessment/Plan (OT)    Criteria for Skilled Therapeutic Interventions Met (OT) no problems identified which require skilled intervention  -ES     Therapy Frequency (OT) evaluation only  -ES       Row Name 11/19/24 1125          Therapy Plan Review/Discharge Plan (OT)    Anticipated Discharge Disposition (OT) home  -ES       Row Name 11/19/24 1125          Positioning and Restraints    Pre-Treatment Position sitting in chair/recliner  -ES     Post Treatment Position chair  -ES     In Chair reclined;call light within reach;encouraged to call for assist  no chair alarm present at OT arrival to room.  -ES               User Key  (r) = Recorded By, (t) = Taken By, (c) = Cosigned By      Initials Name Provider Type    Ana Pcak, OTR/L,  CSRS Occupational Therapist                   Outcome Measures       Row Name 11/19/24 1128          How much help from another is currently needed...    Putting on and taking off regular lower body clothing? 4  -ES     Bathing (including washing, rinsing, and drying) 4  -ES     Toileting (which includes using toilet bed pan or urinal) 4  -ES     Putting on and taking off regular upper body clothing 4  -ES     Taking care of personal grooming (such as brushing teeth) 4  -ES     Eating meals 4  -ES     AM-PAC 6 Clicks Score (OT) 24  -ES       Row Name 11/19/24 0730          How much help from another person do you currently need...    Turning from your back to your side while in flat bed without using bedrails? 4  -MM     Moving from lying on back to sitting on the side of a flat bed without bedrails? 4  -MM     Moving to and from a bed to a chair (including a wheelchair)? 4  -MM     Standing up from a chair using your arms (e.g., wheelchair, bedside chair)? 4  -MM     Climbing 3-5 steps with a railing? 4  -MM     To walk in hospital room? 4  -MM     AM-PAC 6 Clicks Score (PT) 24  -MM       Row Name 11/19/24 1128          Functional Assessment    Outcome Measure Options AM-PAC 6 Clicks Daily Activity (OT)  -ES               User Key  (r) = Recorded By, (t) = Taken By, (c) = Cosigned By      Initials Name Provider Type    Sherif Philip, RN Registered Nurse    Ana Pack, OTR/L, CSRS Occupational Therapist                    Occupational Therapy Education        No education to display                  OT Recommendation and Plan  Therapy Frequency (OT): evaluation only  Plan of Care Review  Plan of Care Reviewed With: patient  Outcome Evaluation: Patient presents at or near baseline functional status at time of evaluation with no identified functional deficits that impede patient independence with activities of daily living.  No indicated need for skilled occupational therapy intervention in the acute care  setting.  Occupational therapy will sign off at this time.     Time Calculation:   Evaluation Complexity (OT)  Review Occupational Profile/Medical/Therapy History Complexity: brief/low complexity  Assessment, Occupational Performance/Identification of Deficit Complexity: 1-3 performance deficits  Clinical Decision Making Complexity (OT): problem focused assessment/low complexity  Overall Complexity of Evaluation (OT): low complexity     Time Calculation- OT       Row Name 11/19/24 1128             Time Calculation- OT    OT Received On 11/19/24  -ES         Untimed Charges    OT Eval/Re-eval Minutes 31  -ES         Total Minutes    Untimed Charges Total Minutes 31  -ES       Total Minutes 31  -ES                User Key  (r) = Recorded By, (t) = Taken By, (c) = Cosigned By      Initials Name Provider Type    ES Ana Tello, OTR/L, CSRS Occupational Therapist                  Therapy Charges for Today       Code Description Service Date Service Provider Modifiers Qty    24786798739 HC OT EVAL LOW COMPLEXITY 3 11/19/2024 Ana Tello, OTR/L, CSRS GO 1                 Ana Tello OTR/L, CSRS  11/19/2024

## 2024-11-19 NOTE — PROGRESS NOTES
[facility]   Hospitalist Progress Note  Date: 2024  Patient Name: Rosalio Restrepo  : 1965  MRN: [id number]  Date of admission: 2024      Subjective   Subjective     Chief complaint: Left-sided numbness and headache    Summary:  58-year-old male with no notable past medical history, hospitalized on 2024 for chief complaint of left-sided numbness and headache, acute onset, concerning for strokelike symptoms, presented to the emergency room, found to have impaired renal function, teleneuro consulted, concerning for stroke, initial CT scan negative, MRI pursued, confirmed acute stroke within the right centrum semiovale, with chronic microvascular ischemic changes, MRI angio indicative of poor flow in the right MCA trifurcation with possible occlusion of the proximal anterior right M2 MCA branch, CT angios ordered and pending, carotid Dopplers came back normal.  Stroke protocol initiated and followed.    Interval follow-up: doing some better, no distress, ambulating some.    Objective   Objective     Vitals:   Temp:  [97.9 °F (36.6 °C)-98.6 °F (37 °C)] 98.2 °F (36.8 °C)  Heart Rate:  [55-67] 61  Resp:  [18-20] 20  BP: (148-183)/() 179/83  Physical Exam               Constitutional: Awake, alert, no acute distress              Eyes: Pupils equal, sclerae anicteric, no conjunctival injection              HENT: NCAT, mucous membranes moist              Neck: Supple              Respiratory: Clear to auscultation bilaterally, nonlabored respirations               Cardiovascular: RRR, no m              Gastrointestinal: Positive bowel sounds, soft, nontender, nondistended              Musculoskeletal: No bilateral ankle edema, no clubbing or cyanosis to extremities              Psychiatric: Appropriate affect, cooperative              Neurologic: speech clear              Skin: No rashes     Result Review    Result Review:  I have personally reviewed the pertinent results from the past 24  hours to 11/19/2024 17:36 EST and agree with these findings:  [x]  Laboratory   CBC          11/17/2024    18:20 11/19/2024    04:05   CBC   WBC 5.55  4.45    RBC 5.33  5.07    Hemoglobin 15.5  14.5    Hematocrit 47.4  45.1    MCV 88.9  89.0    MCH 29.1  28.6    MCHC 32.7  32.2    RDW 12.5  12.5    Platelets 281  246      BMP          11/17/2024    18:20 11/18/2024    16:29 11/19/2024    04:05   BMP   BUN 22  22  21    Creatinine 2.16  1.97  1.92    Sodium 142  142  139    Potassium 4.3  4.4  4.0    Chloride 106  108  106    CO2 25.7  23.7  23.0    Calcium 9.2  9.1  8.8      LIVER FUNCTION TESTS:      Lab 11/19/24  0405 11/17/24  1820   TOTAL PROTEIN 6.6 7.6   ALBUMIN 3.6 4.2   GLOBULIN  --  3.4   ALT (SGPT) 21 26   AST (SGOT) 19 23   BILIRUBIN 0.4 0.4   INDIRECT BILIRUBIN 0.3  --    BILIRUBIN DIRECT 0.1  --    ALK PHOS 85 95       [x]  Microbiology   Microbiology Results (last 10 days)       ** No results found for the last 240 hours. **              [x]  Radiology CT Angiogram Head    Result Date: 11/18/2024  (COMBINED) No emergent large vessel occlusion. No definite hemodynamically significant arterial stenoses are seen. The bilateral vertebral arteries patent and codominant. The study is limited, especially due to venous contamination and limitations of the overall intracranial arterial opacification. Please see above comments for further detail. Portions of this note were completed with a voice recognition program. Electronically Signed: Stalin Hernández MD  11/18/2024 10:18 PM EST  Workstation ID: [id number]    CT Angiogram Neck    Result Date: 11/18/2024  (COMBINED) No emergent large vessel occlusion. No definite hemodynamically significant arterial stenoses are seen. The bilateral vertebral arteries patent and codominant. The study is limited, especially due to venous contamination and limitations of the overall intracranial arterial opacification. Please see above comments for further detail. Portions of this  note were completed with a voice recognition program. Electronically Signed: Stalin Hernández MD  11/18/2024 10:18 PM EST  Workstation ID: [id number]    MRI Angiogram Head Without Contrast    Result Date: 11/18/2024  Impression: Examination is limited due to motion artifact. Poor flow signal seen about the right MCA trifurcation with possible occlusion of a proximal anterior right M2 MCA branch vessel (see series 2, image 69). Recommend further evaluation with CT angiogram The above findings were discussed with Sherif Bowden via telephone on 11/18/2024 9:08 AM EST. Electronically Signed: Chaparro Funez MD  11/18/2024 9:10 AM EST  Workstation ID: [id number]    MRI Brain Without Contrast    Result Date: 11/18/2024  Impression: 1. Restricted diffusion within the right centrum semiovale consistent with acute infarct. 2. Moderate chronic microvascular ischemic changes. These results were called to the patient's nurse on 11/18/2024 at 9:07 a.m. Eastern standard time. Electronically Signed: Bernadine Aguila MD  11/18/2024 9:07 AM EST  Workstation ID: [id number]     Renal Bilateral    Result Date: 11/18/2024   1. No hydronephrosis is seen bilaterally.  2. No definite nephrolithiasis.  3. No suspicious renal masses are seen.  4. The renal parenchymal is abnormally increased bilaterally, suggesting chronic medical renal disease.  5. No renal cortical scarring is suggested.  6. Grossly, no definite focal abnormality of the urinary bladder.  7. Please see above comments for further detail.    Portions of this note were completed with a voice recognition program.  Electronically Signed By-Stalin Hernández MD On:11/18/2024 2:10 AM      XR Chest 1 View    Result Date: 11/17/2024  No acute infiltrate is appreciated. No cardiac enlargement.    Portions of this note were completed with a voice recognition program.  Electronically Signed By-Stalin Hernández MD On:11/17/2024 7:05 PM      CT Head Without Contrast Stroke Protocol    Result Date:  11/17/2024  Impression: No evidence of acute intracranial abnormality. Electronically Signed: Jt Schwartz MD  11/17/2024 6:36 PM EST  Workstation ID: [id number]       []  EKG/Telemetry   ECG 12 Lead ED Triage Standing Order; Acute Stroke (Onset <12 hrs)   Preliminary Result   HEART RATE=67  bpm   RR Iexpspjx=585  ms   CT Scfvaxxa=296  ms   P Horizontal Axis=-11  deg   P Front Axis=41  deg   QRSD Interval=90  ms   QT Yykmgksm=999  ms   JGlO=767  ms   QRS Axis=20  deg   T Wave Axis=141  deg   - ABNORMAL ECG -   Sinus rhythm   Consider left ventricular hypertrophy   Nonspecific T abnormalities, lateral leads   ST elevation, consider anterior injury   Date and Time of Study:2024-11-17 18:53:02          []  Cardiology/Vascular   []  Pathology  [x]  Old records  []  Other:    Assessment & Plan   Assessment / Plan     Assessment/Plan:  Assessment:  Acute stroke likely ischemic of the right centrum semiovale  Concern for occlusion of proximal anterior right M2 MCA branch artery  Likely CKD, stage IIIa with impaired renal function and significant proteinuria  Uncontrolled hypertension  Hypertensive crisis    Plan:  Labs and imaging reviewed  Follow stroke protocol  PT/OT  Diet per speech  High intensity statin  Aspirin 81 mg daily  Start Plavix 75 mg daily  Nephrology consulted discussed with Dr. Rios, patient with underlying CKD likely new diagnosis for him secondary to uncontrolled hypertension with significant proteinuria  Renal ultrasound performed, showing chronic medical renal disease  Slow correction of blood pressure, correct only if systolic greater than 220 and diastolics greater than 120, after 48 hours then pursue further correction; utilize labetalol or hydralazine  CTA head and neck ordered to evaluate further occlusion of anterior right M2 MCA branch artery  Continue telemetry monitor  Follow-up echo  A.m. labs  Full code  DVT prophylaxis with SCDs  Working with [facility] re: aura planning    VTE  Prophylaxis:  Mechanical VTE prophylaxis orders are present.        CODE STATUS:   Code Status (Patient has no pulse and is not breathing): CPR (Attempt to Resuscitate)  Medical Interventions (Patient has pulse or is breathing): Full Support    Electronically signed by Josiah Tyler MD, 11/19/24, 5:37 PM EST.

## 2024-11-19 NOTE — THERAPY EVALUATION
"Acute Care - Speech Language Pathology   Swallow Initial Evaluation  [facility]     Patient Name: Rosalio Restrepo  : 1965  MRN: [id number]  Today's Date: 2024               Admit Date: 2024    Visit Dx:     ICD-10-CM ICD-9-CM   1. Left sided numbness  R20.0 782.0   2. Stroke-like symptoms  R29.90 781.99   3. Elevated serum creatinine  R79.89 790.99   4. Severe hypertension  I10 401.9   5. Dysphagia, unspecified type  R13.10 787.20     Patient Active Problem List   Diagnosis    Stroke    Essential hypertension    CKD (chronic kidney disease), stage III     History reviewed. No pertinent past medical history.  History reviewed. No pertinent surgical history.    SLP Recommendation and Plan          Inpatient Speech Pathology Dysphagia Evaluation        PAIN SCALE: Patient did not indicate that he was having any pain.     PRECAUTIONS/CONTRAINDICATIONS:  Aspiration risk.     SUSPECTED ABUSE/NEGLECT/EXPLOITATION:  No    PRIOR FUNCTION:  within functional limits.    PATIENT GOALS/EXPECTATIONS:  To consume least restrictive diet without signs/symptoms of aspiration. Determine type and severity of current deficits.     HISTORY:  Patient is a 58 year old male that presented to the ED for evaluation of left sided numbness and headache. Patient was found to have impaired renal function and acute stroke within the right centrum semiovale.     CURRENT DIET LEVEL:  Level 7 regular solids and level 0 thin liquids.     OBJECTIVE:    TEST ADMINISTERED:  Oral Mechanisms Exam and Clinical Swallow Evaluation    COGNITION/SAFETY AWARENESS: Patient endorses increased brain fog and anomia and reduced receptive language skills compared to baseline. Patient reported that he feels he has improved since initial admission but has not yet returned to \"normal\".    BEHAVIORAL OBSERVATIONS:  Patient was pleasant and cooperative. Patient's wife is present in the room with him as she is also hospitalized. Patient did well interacting " with SLP and occasionally required repetitions to comprehend a question or instruction. Patient reported having no questions after evaluation.     ORAL MOTOR EXAM:  mild weakness of the left side including facial, lingual, and labial muscles. Patient had a mild deviation of the tongue to the right indicating weakness on the left side. Patient denied any pocketing on the left side when eating and denied reduced sensation on the left side.     VOICE QUALITY:  Rough.    REFLEX EXAM:  Velopharyngeal function appeared to be within normal limits.     POSTURE:  Upright for oral care and when eating or drinking.     FEEDING/SWALLOWING FUNCTION:  Patient participated in clinical swallow evaluation and trialed level 0 thin liquids via cup and straw, level 4 pureed solids, level 6 soft and bite sized solids, and level 7 regular solids. Patient did not demonstrate any overt signs/symptoms of aspiration with any of the consistencies trialed.    CLINICAL OBSERVATIONS:  Patient demonstrated normal mastication. Patient's swallow appeared timely with appropriate hyolaryngeal excursion.     DYSPHAGIA CRITERIA:  Aspiration risk. Stroke.     FUNCTIONAL ASSESSMENT INSTRUMENT: Patient currently scored a level 7 of 7 on Functional Communication Measures for swallowing indicating a 0% limitation in function.    ASSESSMENT/ PLAN OF CARE:  Patient does not require follow up services for swallow at this time. Patient would benefit from more formal evaluation of cognition and language as patient endorsed increased brain fog and anomia.    REHAB POTENTIAL:  Pt has good rehab potential.  The following limitations may influence improvement/ length of tx  medical status/mental status.    RECOMMENDATIONS:   1.   DIET: Level 7 regular solids and level 0 thin liquids.     2.  POSITION: Upright for oral care and when eating or drinking.     3.  COMPENSATORY STRATEGIES: Eat at a slow rate and take small bites and sips.     Pt/responsible party agrees  with plan of care and has been informed of all alternatives, risks and benefits.                                                                                    EDUCATION  The patient has been educated in the following areas:   Dysphagia (Swallowing Impairment).                Time Calculation:    Time Calculation- SLP       Row Name 11/19/24 0700             Time Calculation- SLP    SLP Start Time 0700  -AW      SLP Stop Time 0800  -AW      SLP Time Calculation (min) 60 min  -AW         Untimed Charges    24428-SK Eval Oral Pharyng Swallow Minutes 60  -AW         Total Minutes    Untimed Charges Total Minutes 60  -AW       Total Minutes 60  -AW                User Key  (r) = Recorded By, (t) = Taken By, (c) = Cosigned By      Initials Name Provider Type    AW Liset Danielson SLP Speech and Language Pathologist                    Therapy Charges for Today       Code Description Service Date Service Provider Modifiers Qty    39651370660 HC ST EVAL ORAL PHARYNG SWALLOW 4 11/19/2024 Liset Danielson SLP GN 1                 KRISTEN Trevizo  11/19/2024

## 2024-11-19 NOTE — PROGRESS NOTES
[facility]     Progress Note    Patient Name: Rosalio Restrepo  : 1965  MRN: [id number]  Primary Care Physician:  Provider, No Known  Date of admission: 2024    Subjective   Patient is feeling better he did not express any new concerns or issues blood pressure is under better control  Review of Systems  All review of systems are negative except as mentioned in subjective complaints.    Objective   Objective     Vitals:   Temp:  [97.9 °F (36.6 °C)-98.8 °F (37.1 °C)] 98.1 °F (36.7 °C)  Heart Rate:  [62-72] 62  Resp:  [18-20] 18  BP: (148-183)/() 148/106  Physical Exam    Constitutional: Awake, alert responsive, conversant, no obvious distress              Psychiatric:  Appropriate affect, cooperative   Neurologic:  Awake alert ,oriented x 3, strength symmetric in all extremities, Cranial Nerves grossly intact to confrontation, speech clear   Eyes:   PERRLA, sclerae anicteric, no conjunctival injection   HEENT:  Moist mucous membranes, no nasal or eye discharge, no throat congestion   Neck:   Supple, no thyromegaly, no lymphadenopathy, trachea midline, no elevated JVD   Respiratory:  Clear to auscultation bilaterally, nonlabored respirations    Cardiovascular: RRR, no murmurs, rubs, or gallops, palpable pedal pulses bilaterally, No bilateral ankle edema   Gastrointestinal: Positive bowel sounds, soft, nontender, nondistended, no organomegaly   Musculoskeletal:  No clubbing or cyanosis to extremities,muscle wasting, joint swelling, muscle weakness             Skin:                      No rashes, bruising, skin ulcers, petechiae or ecchymosis    Result Review    Result Review:  I have personally reviewed the results from the time of this admission to 2024 07:52 EST and agree with these findings:  []  Laboratory  []  Microbiology  []  Radiology  []  EKG/Telemetry   []  Cardiology/Vascular   []  Pathology  []  Old records  []  Other:    Results from last 7 days   Lab Units 24  1341  11/17/24  1820   WBC 10*3/mm3 4.45 5.55   HEMOGLOBIN g/dL 14.5 15.5   PLATELETS 10*3/mm3 246 281     Results from last 7 days   Lab Units 11/19/24  0405 11/18/24  1629 11/17/24  1820   SODIUM mmol/L 139 142 142   POTASSIUM mmol/L 4.0 4.4 4.3   CHLORIDE mmol/L 106 108* 106   CO2 mmol/L 23.0 23.7 25.7   ANION GAP mmol/L 10.0 10.3 10.3   BUN mg/dL 21* 22* 22*   CREATININE mg/dL 1.92* 1.97* 2.16*   GLUCOSE mg/dL 89 91 100*       Assessment & Plan   Assessment / Plan       Active Hospital Problems:    Active Hospital Problems    Diagnosis  POA    **Stroke [I63.9]  Yes    Essential hypertension [I10]  Unknown    CKD (chronic kidney disease), stage III [N18.30]  Unknown     Most likely secondary to hypertension  1.5 g of proteinuria  Urine analysis otherwise unremarkable  Renal ultrasound showed increased cortical echogenicity         Plan:   Continue present care and when patient gets discharge we will follow-up in the office       Electronically signed by Barby Rios MD, 11/19/24, 7:52 AM EST.

## 2024-11-19 NOTE — PLAN OF CARE
Goal Outcome Evaluation:  Plan of Care Reviewed With: patient, spouse        Progress: improving  Outcome Evaluation: VSS. NIH 0. Medicated for headache x1 per MAR.

## 2024-11-19 NOTE — PROGRESS NOTES
"[facility] Progress Note    Date of Service 11/19/2024      Presentation:    Based on previous neurology note(s)   : \" 58-year-old gentleman with undiagnosed and uncontrolled hypertension who presents with right-sided headache and left arm and face numbness, NIHSS is 1. Blood pressures in the 200s over 100s.         Interval history:           11/18/2024: nursing does not report any significant new events overnight.     11/19: nursing does not report any significant new events overnight.      Impression:     Left facial numbness, due to Acute Ischemic Stroke     Recommendations:   (Primary Team to order Controlled Medications)  Unless specifically noted I Agree with Impression and Plan from previous Neurology note/consult.     1- Risk factor management If stroke is confirmed:  Statin for Goal LDL<70, primary team to order     Goal HbA1c<7;      Blood pressure management:  During the first 48 hours after stroke onset If BP greater than 220/120 give IV Labetalol or Enalapril; then after 48 hours from stroke onset should slowly and gradually lower Blood Pressure to <130/80 over the course of one week (if no cardiovascular contraindication or any critical/severe intra or extracranial arterial stenosis, and if it does not elicit or exacerbate symptoms).  avoid hypotension or rapid decrease in Blood Pressure.        2- Work up and Results:   Brain MRI: Restricted diffusion within the right centrum semiovale consistent with acute infarct.     Head and neck Vascular imaging: CTA neck unremarkable, MRA head poor quality but reported possible right M2 occlusion  ECHO: Pending , defer to primary team to review   LDL and HbA1c: 180/5.2     3- Antithrombotic regimen:  A- aspirin and plavix (new medications) for 90 days followed by Aspirin monotherapy indefinitely.      4- Nursing recommendations:  IV Fluids, avoid dextrose containing fluids  Maintain euglycemia  Neuro checks every 4 hours for 24 hours and then " per shift  Head of bed 30 degrees     5- Life Style modifications for stroke prevention should be followed:  Diet. Mediterranean diet rich in fruits, vegetables, whole grains, fish, legumes, plant-based oils preferred over animal fats. Reduce sodium intake as directed by primary care physician.  Exercise. Aim for 150-minutes of moderate to intense exercise per week in sessions of at least 10 minutes duration as tolerated.  Tobacco. Tobacco cessation with counseling and/or pharmacologic management  Alcohol. Reduce alcohol consumption as directed by primary care physician.  Hormone therapy. Avoid estrogen and testosterone containing medications  Sleep. Evaluation and treatment of sleep apnea  Return precautions. Seek emergency medical attention if you cannot see, speak, move or feel as you do normally for any abnormal length of time as these may be signs of a stroke        Neurology will sign off. Please notify neurology if TTE shows cardiac thrombus or PFO.   Follow up with outpatient neurology in 2-3 weeks.    Please contact [facility] to reach me if further questions/concerns arise.    Examination:     awake, alert, Oriented x3  speech no aphasia  Extraocular movements intact  Face is symmetric   arms no drift (10s)  coordination intact in finger to nose         -----------------------------------------------------------  Patient / Family was informed the Neurology Consult would occur via TeleHealth consult by way of interactive audio and video telecommunications and consented to receiving care in this manner.     Patient is being evaluated for possible acute neurologic impairment and high probability of imminent or life - threatening deterioration. I spent total of 12 minutes providing care to this patient, including time for face to face visit via telemedicine, review of medical records, imaging studies and discussion of findings with providers, the patient and / or family.        Dr Knox  Behravan        [facility]  For Inpatient follow-up with [facility] physician please call [facility] [phone]. This is not an outpatient service. Post hospital discharge, please contact hospital directly.     Please do not communicate with [facility] physicians via secure chat. If you have any questions, Please contact [facility].  Please call or reconsult our service if there are any clinical or diagnostic changes.

## 2024-11-20 PROCEDURE — 99232 SBSQ HOSP IP/OBS MODERATE 35: CPT | Performed by: INTERNAL MEDICINE

## 2024-11-20 RX ORDER — AMLODIPINE BESYLATE 5 MG/1
5 TABLET ORAL
Status: DISCONTINUED | OUTPATIENT
Start: 2024-11-20 | End: 2024-11-21

## 2024-11-20 RX ADMIN — AMLODIPINE BESYLATE 5 MG: 5 TABLET ORAL at 08:33

## 2024-11-20 RX ADMIN — Medication 10 ML: at 08:34

## 2024-11-20 RX ADMIN — ASPIRIN 81 MG: 81 TABLET, COATED ORAL at 08:33

## 2024-11-20 RX ADMIN — Medication 10 ML: at 21:14

## 2024-11-20 RX ADMIN — ATORVASTATIN CALCIUM 40 MG: 40 TABLET, FILM COATED ORAL at 08:33

## 2024-11-20 RX ADMIN — CLOPIDOGREL BISULFATE 75 MG: 75 TABLET ORAL at 08:33

## 2024-11-20 NOTE — PLAN OF CARE
Goal Outcome Evaluation:  Plan of Care Reviewed With: patient        Progress: improving  Outcome Evaluation: Pt contines to improve.  blood pressure remains elevated but is still being allowed at this time. asymptomatic. nih remains at a 0  Pt plans to return home today if allowed.  Family at bedside earlier and supportive of patient.

## 2024-11-20 NOTE — PROGRESS NOTES
[facility]     Progress Note    Patient Name: Rosalio Restrepo  : 1965  MRN: [id number]  Primary Care Physician:  Provider, No Known  Date of admission: 2024    Subjective patient is doing fine he has no new issues blood pressure is running little bit on the higher side however patient is not on antihypertensive medications    Review of Systems  All review of systems are negative except as mentioned in subjective complaints.    Objective   Objective     Vitals:   Temp:  [97.7 °F (36.5 °C)-98.4 °F (36.9 °C)] 98.1 °F (36.7 °C)  Heart Rate:  [59-61] 60  Resp:  [18-20] 20  BP: (151-185)/(75-93) 184/92  Physical Exam    Constitutional: Awake, alert responsive, conversant, no obvious distress              Psychiatric:  Appropriate affect, cooperative   Neurologic:  Awake alert ,oriented x 3, strength symmetric in all extremities, Cranial Nerves grossly intact to confrontation, speech clear   Eyes:   PERRLA, sclerae anicteric, no conjunctival injection   HEENT:  Moist mucous membranes, no nasal or eye discharge, no throat congestion   Neck:   Supple, no thyromegaly, no lymphadenopathy, trachea midline, no elevated JVD   Respiratory:  Clear to auscultation bilaterally, nonlabored respirations    Cardiovascular: RRR, no murmurs, rubs, or gallops, palpable pedal pulses bilaterally, No bilateral ankle edema   Gastrointestinal: Positive bowel sounds, soft, nontender, nondistended, no organomegaly   Musculoskeletal:  No clubbing or cyanosis to extremities,muscle wasting, joint swelling, muscle weakness             Skin:                      No rashes, bruising, skin ulcers, petechiae or ecchymosis    Result Review    Result Review:  I have personally reviewed the results from the time of this admission to 2024 08:25 EST and agree with these findings:  []  Laboratory  []  Microbiology  []  Radiology  []  EKG/Telemetry   []  Cardiology/Vascular   []  Pathology  []  Old records  []  Other:    Results from last 7  days   Lab Units 11/19/24  0405 11/17/24  1820   WBC 10*3/mm3 4.45 5.55   HEMOGLOBIN g/dL 14.5 15.5   PLATELETS 10*3/mm3 246 281     Results from last 7 days   Lab Units 11/19/24  0405 11/18/24  1629 11/17/24  1820   SODIUM mmol/L 139 142 142   POTASSIUM mmol/L 4.0 4.4 4.3   CHLORIDE mmol/L 106 108* 106   CO2 mmol/L 23.0 23.7 25.7   ANION GAP mmol/L 10.0 10.3 10.3   BUN mg/dL 21* 22* 22*   CREATININE mg/dL 1.92* 1.97* 2.16*   GLUCOSE mg/dL 89 91 100*       Assessment & Plan   Assessment / Plan       Active Hospital Problems:    Active Hospital Problems    Diagnosis  POA    **Stroke [I63.9]  Yes    Essential hypertension [I10]  Unknown    CKD (chronic kidney disease), stage III [N18.30]  Unknown     Most likely secondary to hypertension  1.5 g of proteinuria  Urine analysis otherwise unremarkable  Renal ultrasound showed increased cortical echogenicity         Plan:   Start amlodipine 5 mg  Patient most likely has hypertension induced nephrosclerosis  Patient might benefit from ACE inhibitor but we will start 1 medicine at a time and then we will add ACE/ARB as outpatient       Electronically signed by Barby Rios MD, 11/20/24, 8:25 AM EST.

## 2024-11-20 NOTE — PLAN OF CARE
Goal Outcome Evaluation:  Plan of Care Reviewed With: patient        Progress: improving  Outcome Evaluation: no acute changes this shift. pt's BPs remain elevated throughout shift, with allowance per MD. pt started on low dose of Norvasc and will be monitored overnight with possible discharge  tomorrow. family remained at bedside throughout shift

## 2024-11-20 NOTE — PROGRESS NOTES
[facility]   Hospitalist Progress Note  Date: 2024  Patient Name: Rosalio Restrepo  : 1965  MRN: [id number]  Date of admission: 2024      Subjective   Subjective     Chief complaint: Left-sided numbness and headache    Summary:  58-year-old male with no notable past medical history, hospitalized on 2024 for chief complaint of left-sided numbness and headache, acute onset, concerning for strokelike symptoms, presented to the emergency room, found to have impaired renal function, teleneuro consulted, concerning for stroke, initial CT scan negative, MRI pursued, confirmed acute stroke within the right centrum semiovale, with chronic microvascular ischemic changes, MRI angio indicative of poor flow in the right MCA trifurcation with possible occlusion of the proximal anterior right M2 MCA branch, CT angios ordered and pending, carotid Dopplers came back normal.  Stroke protocol initiated and followed.    Interval follow-up: Patient seen and examined resting comfortably blood pressures markedly elevated started on Norvasc today will defer discharge today see how blood pressures do through the night  Objective   Objective     Vitals:   Temp:  [97.7 °F (36.5 °C)-98.4 °F (36.9 °C)] 97.9 °F (36.6 °C)  Heart Rate:  [60-63] 63  Resp:  [16-20] 16  BP: (151-185)/(75-97) 173/97  Physical Exam               Constitutional: Awake, alert, no acute distress              Eyes: Pupils equal, sclerae anicteric, no conjunctival injection              HENT: NCAT, mucous membranes moist              Neck: Supple              Respiratory: Clear to auscultation bilaterally, nonlabored respirations               Cardiovascular: RRR, no m              Gastrointestinal: Positive bowel sounds, soft, nontender, nondistended              Musculoskeletal: No bilateral ankle edema, no clubbing or cyanosis to extremities              Psychiatric: Appropriate affect, cooperative              Neurologic: speech clear               Skin: No rashes     Result Review    Result Review:  I have personally reviewed the pertinent results from the past 24 hours to 11/20/2024 13:57 EST and agree with these findings:  [x]  Laboratory   CBC          11/17/2024    18:20 11/19/2024    04:05   CBC   WBC 5.55  4.45    RBC 5.33  5.07    Hemoglobin 15.5  14.5    Hematocrit 47.4  45.1    MCV 88.9  89.0    MCH 29.1  28.6    MCHC 32.7  32.2    RDW 12.5  12.5    Platelets 281  246      BMP          11/17/2024    18:20 11/18/2024    16:29 11/19/2024    04:05   BMP   BUN 22  22  21    Creatinine 2.16  1.97  1.92    Sodium 142  142  139    Potassium 4.3  4.4  4.0    Chloride 106  108  106    CO2 25.7  23.7  23.0    Calcium 9.2  9.1  8.8      LIVER FUNCTION TESTS:      Lab 11/19/24  0405 11/17/24  1820   TOTAL PROTEIN 6.6 7.6   ALBUMIN 3.6 4.2   GLOBULIN  --  3.4   ALT (SGPT) 21 26   AST (SGOT) 19 23   BILIRUBIN 0.4 0.4   INDIRECT BILIRUBIN 0.3  --    BILIRUBIN DIRECT 0.1  --    ALK PHOS 85 95       [x]  Microbiology   Microbiology Results (last 10 days)       ** No results found for the last 240 hours. **              [x]  Radiology CT Angiogram Head    Result Date: 11/18/2024  (COMBINED) No emergent large vessel occlusion. No definite hemodynamically significant arterial stenoses are seen. The bilateral vertebral arteries patent and codominant. The study is limited, especially due to venous contamination and limitations of the overall intracranial arterial opacification. Please see above comments for further detail. Portions of this note were completed with a voice recognition program. Electronically Signed: Stalin Hernández MD  11/18/2024 10:18 PM EST  Workstation ID: [id number]    CT Angiogram Neck    Result Date: 11/18/2024  (COMBINED) No emergent large vessel occlusion. No definite hemodynamically significant arterial stenoses are seen. The bilateral vertebral arteries patent and codominant. The study is limited, especially due to venous contamination and  limitations of the overall intracranial arterial opacification. Please see above comments for further detail. Portions of this note were completed with a voice recognition program. Electronically Signed: Stalin Hernández MD  11/18/2024 10:18 PM EST  Workstation ID: [id number]    MRI Angiogram Head Without Contrast    Result Date: 11/18/2024  Impression: Examination is limited due to motion artifact. Poor flow signal seen about the right MCA trifurcation with possible occlusion of a proximal anterior right M2 MCA branch vessel (see series 2, image 69). Recommend further evaluation with CT angiogram The above findings were discussed with Sherif Bowden via telephone on 11/18/2024 9:08 AM EST. Electronically Signed: Chaparro Funez MD  11/18/2024 9:10 AM EST  Workstation ID: [id number]    MRI Brain Without Contrast    Result Date: 11/18/2024  Impression: 1. Restricted diffusion within the right centrum semiovale consistent with acute infarct. 2. Moderate chronic microvascular ischemic changes. These results were called to the patient's nurse on 11/18/2024 at 9:07 a.m. Eastern standard time. Electronically Signed: Bernadine Aguila MD  11/18/2024 9:07 AM EST  Workstation ID: [id number]     Renal Bilateral    Result Date: 11/18/2024   1. No hydronephrosis is seen bilaterally.  2. No definite nephrolithiasis.  3. No suspicious renal masses are seen.  4. The renal parenchymal is abnormally increased bilaterally, suggesting chronic medical renal disease.  5. No renal cortical scarring is suggested.  6. Grossly, no definite focal abnormality of the urinary bladder.  7. Please see above comments for further detail.    Portions of this note were completed with a voice recognition program.  Electronically Signed By-Stalin Hernández MD On:11/18/2024 2:10 AM      XR Chest 1 View    Result Date: 11/17/2024  No acute infiltrate is appreciated. No cardiac enlargement.    Portions of this note were completed with a voice recognition program.   Electronically Signed By-Stalin Hernández MD On:11/17/2024 7:05 PM      CT Head Without Contrast Stroke Protocol    Result Date: 11/17/2024  Impression: No evidence of acute intracranial abnormality. Electronically Signed: Jt Schwartz MD  11/17/2024 6:36 PM EST  Workstation ID: [id number]       []  EKG/Telemetry   ECG 12 Lead ED Triage Standing Order; Acute Stroke (Onset <12 hrs)   Preliminary Result   HEART RATE=67  bpm   RR Ofblueeu=295  ms   OK Iwygtmfv=990  ms   P Horizontal Axis=-11  deg   P Front Axis=41  deg   QRSD Interval=90  ms   QT Xtdhtbfh=889  ms   VPsV=470  ms   QRS Axis=20  deg   T Wave Axis=141  deg   - ABNORMAL ECG -   Sinus rhythm   Consider left ventricular hypertrophy   Nonspecific T abnormalities, lateral leads   ST elevation, consider anterior injury   Date and Time of Study:2024-11-17 18:53:02          []  Cardiology/Vascular   []  Pathology  [x]  Old records  []  Other:    Assessment & Plan   Assessment / Plan     Assessment/Plan:  Assessment:  Acute stroke likely ischemic of the right centrum semiovale  Concern for occlusion of proximal anterior right M2 MCA branch artery  Likely CKD, stage IIIa with impaired renal function and significant proteinuria  Uncontrolled hypertension  Hypertensive crisis    Plan:  Start Norvasc 5 mg daily  Will defer discharge today will monitor blood pressures overnight if improved tomorrow can discharge home with outpatient follow-up and further titration of antihypertensives  Labs and imaging reviewed  Follow stroke protocol  PT/OT  Diet per speech  High intensity statin  Aspirin 81 mg daily  Start Plavix 75 mg daily  Nephrology consulted discussed with Dr. Rios, patient with underlying CKD likely new diagnosis for him secondary to uncontrolled hypertension with significant proteinuria  Renal ultrasound performed, showing chronic medical renal disease  Slow correction of blood pressure, correct only if systolic greater than 220 and diastolics greater than 120,  after 48 hours then pursue further correction; utilize labetalol or hydralazine  CTA head and neck ordered to evaluate further occlusion of anterior right M2 MCA branch artery  Continue telemetry monitor  Follow-up echo  A.m. labs  Full code  DVT prophylaxis with SCDs  Working with SW re: dc planning    VTE Prophylaxis:  Mechanical VTE prophylaxis orders are present.        CODE STATUS:   Code Status (Patient has no pulse and is not breathing): CPR (Attempt to Resuscitate)  Medical Interventions (Patient has pulse or is breathing): Full Support    Electronically signed by GIGI Durant, 11/20/24, 2:00 PM EST.      Attending Documentation:  Patient independently seen and evaluated, above documentation reflects plan put forth during bedside rounds.  More than 51% of the time of this patient encounter was performed by me. I discussed the care plan with LEON Nath PA-C, I agree with his findings and plan as documented, what I have added to the care plan and modified is as follows in my documentation and my medical decision making; 58-year-old male with no notable past medical history presented on 11/17/2024 for left-sided numbness and headache, was found to have a stroke.  Continues on DAPT.  Blood pressure elevated, begin Norvasc today, I would anticipate he can discharge tomorrow if blood pressures are improved but I would like to see some improvement before he is released.  Electronically signed by Josiah Tyler MD, 11/20/24, 2:55 PM EST.

## 2024-11-21 ENCOUNTER — READMISSION MANAGEMENT (OUTPATIENT)
Dept: CALL CENTER | Facility: HOSPITAL | Age: 59
End: 2024-11-21
Payer: MEDICAID

## 2024-11-21 VITALS
SYSTOLIC BLOOD PRESSURE: 142 MMHG | RESPIRATION RATE: 16 BRPM | OXYGEN SATURATION: 99 % | HEIGHT: 67 IN | TEMPERATURE: 98.1 F | BODY MASS INDEX: 38.48 KG/M2 | WEIGHT: 245.15 LBS | HEART RATE: 70 BPM | DIASTOLIC BLOOD PRESSURE: 77 MMHG

## 2024-11-21 LAB
ALBUMIN SERPL-MCNC: 3.4 G/DL (ref 3.5–5.2)
ANION GAP SERPL CALCULATED.3IONS-SCNC: 7.1 MMOL/L (ref 5–15)
BUN SERPL-MCNC: 25 MG/DL (ref 6–20)
BUN/CREAT SERPL: 11.8 (ref 7–25)
CALCIUM SPEC-SCNC: 9 MG/DL (ref 8.6–10.5)
CHLORIDE SERPL-SCNC: 109 MMOL/L (ref 98–107)
CO2 SERPL-SCNC: 25.9 MMOL/L (ref 22–29)
CREAT SERPL-MCNC: 2.11 MG/DL (ref 0.76–1.27)
DEPRECATED RDW RBC AUTO: 40.1 FL (ref 37–54)
EGFRCR SERPLBLD CKD-EPI 2021: 35.4 ML/MIN/1.73
ERYTHROCYTE [DISTWIDTH] IN BLOOD BY AUTOMATED COUNT: 12.2 % (ref 12.3–15.4)
GLUCOSE SERPL-MCNC: 95 MG/DL (ref 65–99)
HCT VFR BLD AUTO: 45.2 % (ref 37.5–51)
HGB BLD-MCNC: 14.2 G/DL (ref 13–17.7)
MAGNESIUM SERPL-MCNC: 2 MG/DL (ref 1.6–2.6)
MCH RBC QN AUTO: 28.1 PG (ref 26.6–33)
MCHC RBC AUTO-ENTMCNC: 31.4 G/DL (ref 31.5–35.7)
MCV RBC AUTO: 89.5 FL (ref 79–97)
PHOSPHATE SERPL-MCNC: 3.1 MG/DL (ref 2.5–4.5)
PLATELET # BLD AUTO: 234 10*3/MM3 (ref 140–450)
PMV BLD AUTO: 11.6 FL (ref 6–12)
POTASSIUM SERPL-SCNC: 4.3 MMOL/L (ref 3.5–5.2)
RBC # BLD AUTO: 5.05 10*6/MM3 (ref 4.14–5.8)
SODIUM SERPL-SCNC: 142 MMOL/L (ref 136–145)
WBC NRBC COR # BLD AUTO: 4.87 10*3/MM3 (ref 3.4–10.8)

## 2024-11-21 PROCEDURE — 83735 ASSAY OF MAGNESIUM: CPT | Performed by: PHYSICIAN ASSISTANT

## 2024-11-21 PROCEDURE — 85027 COMPLETE CBC AUTOMATED: CPT | Performed by: PHYSICIAN ASSISTANT

## 2024-11-21 PROCEDURE — 80069 RENAL FUNCTION PANEL: CPT | Performed by: PHYSICIAN ASSISTANT

## 2024-11-21 PROCEDURE — 99239 HOSP IP/OBS DSCHRG MGMT >30: CPT | Performed by: INTERNAL MEDICINE

## 2024-11-21 RX ORDER — LISINOPRIL 40 MG/1
40 TABLET ORAL
Qty: 30 TABLET | Refills: 0 | Status: SHIPPED | OUTPATIENT
Start: 2024-11-22 | End: 2024-12-22

## 2024-11-21 RX ORDER — CLOPIDOGREL BISULFATE 75 MG/1
75 TABLET ORAL DAILY
Qty: 90 TABLET | Refills: 0 | Status: SHIPPED | OUTPATIENT
Start: 2024-11-22 | End: 2025-02-20

## 2024-11-21 RX ORDER — LISINOPRIL 20 MG/1
40 TABLET ORAL
Status: DISCONTINUED | OUTPATIENT
Start: 2024-11-21 | End: 2024-11-21 | Stop reason: HOSPADM

## 2024-11-21 RX ORDER — ATORVASTATIN CALCIUM 40 MG/1
40 TABLET, FILM COATED ORAL NIGHTLY
Status: DISCONTINUED | OUTPATIENT
Start: 2024-11-21 | End: 2024-11-21 | Stop reason: HOSPADM

## 2024-11-21 RX ORDER — ASPIRIN 81 MG/1
81 TABLET ORAL DAILY
Qty: 90 TABLET | Refills: 0 | Status: SHIPPED | OUTPATIENT
Start: 2024-11-22 | End: 2025-02-20

## 2024-11-21 RX ORDER — ATORVASTATIN CALCIUM 40 MG/1
40 TABLET, FILM COATED ORAL NIGHTLY
Qty: 30 TABLET | Refills: 0 | Status: SHIPPED | OUTPATIENT
Start: 2024-11-21 | End: 2024-12-21

## 2024-11-21 RX ORDER — AMLODIPINE BESYLATE 10 MG/1
10 TABLET ORAL
Qty: 30 TABLET | Refills: 0 | Status: SHIPPED | OUTPATIENT
Start: 2024-11-22 | End: 2024-12-22

## 2024-11-21 RX ORDER — AMLODIPINE BESYLATE 10 MG/1
10 TABLET ORAL
Status: DISCONTINUED | OUTPATIENT
Start: 2024-11-21 | End: 2024-11-21 | Stop reason: HOSPADM

## 2024-11-21 RX ADMIN — LISINOPRIL 40 MG: 20 TABLET ORAL at 09:05

## 2024-11-21 RX ADMIN — CLOPIDOGREL BISULFATE 75 MG: 75 TABLET ORAL at 08:34

## 2024-11-21 RX ADMIN — ASPIRIN 81 MG: 81 TABLET, COATED ORAL at 08:34

## 2024-11-21 RX ADMIN — AMLODIPINE BESYLATE 10 MG: 10 TABLET ORAL at 08:34

## 2024-11-21 RX ADMIN — Medication 10 ML: at 08:34

## 2024-11-21 NOTE — PLAN OF CARE
Goal Outcome Evaluation:  Plan of Care Reviewed With: patient           Outcome Evaluation: Pt. is alert and oriented.  Pt. rested well with no complaints and wife at bedside.  EVERARDO ISAAC RN

## 2024-11-21 NOTE — DISCHARGE SUMMARY
[facility]         HOSPITALIST  DISCHARGE SUMMARY    Patient Name: Rosalio Restrepo  : 1965  MRN: [id number]    Date of Admission: 2024  Date of Discharge: 2024  Primary Care Physician: Provider, No Known  Reason for admission:  Left-sided numbness and headache    Final diagnosis:  Assessment:  Acute stroke likely ischemic of the right centrum semiovale  Concern for occlusion of proximal anterior right M2 MCA branch artery  Likely CKD, stage IIIa with impaired renal function and significant proteinuria  Uncontrolled hypertension  Hypertensive crisis  Consults       Date and Time Order Name Status Description    2024  4:27 PM Inpatient Nephrology Consult      2024  9:07 PM Inpatient Neurology Consult Stroke      2024  8:30 PM Inpatient Hospitalist Consult              Active and Resolved Hospital Problems:  Active Hospital Problems    Diagnosis POA    **Stroke [I63.9] Yes    Essential hypertension [I10] Unknown    CKD (chronic kidney disease), stage III [N18.30] Unknown      Resolved Hospital Problems   No resolved problems to display.       Hospital Course     Hospital Course:  Rosalio Restrepo is a 58 y.o. male with no notable past medical history, hospitalized on 2024 for chief complaint of left-sided numbness and headache, acute onset, concerning for strokelike symptoms, presented to the emergency room, found to have impaired renal function, teleneuro consulted, concerning for stroke, initial CT scan negative, MRI pursued, confirmed acute stroke within the right centrum semiovale, with chronic microvascular ischemic changes, MRI angio indicative of poor flow in the right MCA trifurcation with possible occlusion of the proximal anterior right M2 MCA branch.  Patient kept on high intensity statin therapy started on dual antiplatelet therapy aspirin and Plavix which should be continued for 90 days then aspirin for life.  Patient with renal insufficiency suspect CKD  nephrology consulted patient to follow-up with nephrology outpatient.  Patient with markedly elevated blood pressures has been started on combination Norvasc and lisinopril with improvement of BP.  2D echo pursued showing severe concentric hypertrophy of the LV likely secondary to severe uncontrolled hypertension but will refer to cardiology outpatient for ongoing surveillance.  Seen and examined 11/21/2024 hemodynamically stable for discharge patient to follow-up with his primary care provider follow-up with outpatient neurology follow-up with cardiology patient to return to ED for worsening symptoms.    DISCHARGE Follow Up Recommendations for labs and diagnostics: As above      Day of Discharge     Vital Signs:  Temp:  [97.7 °F (36.5 °C)-98.3 °F (36.8 °C)] 98.1 °F (36.7 °C)  Heart Rate:  [64-76] 70  Resp:  [16-20] 16  BP: (142-190)/() 142/77  Physical Exam:   Constitutional: Awake alert  Respiratory: Clear  Cardiovascular: RRR  GI: Abdomen soft nontender      Discharge Details        Discharge Medications        New Medications        Instructions Start Date   amLODIPine 10 MG tablet  Commonly known as: NORVASC   10 mg, Oral, Every 24 Hours Scheduled   Start Date: November 22, 2024     aspirin 81 MG EC tablet   81 mg, Oral, Daily   Start Date: November 22, 2024     atorvastatin 40 MG tablet  Commonly known as: LIPITOR   40 mg, Oral, Nightly      clopidogrel 75 MG tablet  Commonly known as: PLAVIX   75 mg, Oral, Daily   Start Date: November 22, 2024     lisinopril 40 MG tablet  Commonly known as: PRINIVIL,ZESTRIL   40 mg, Oral, Every 24 Hours Scheduled   Start Date: November 22, 2024              No Known Allergies    Discharge Disposition:  Home or Self Care    Diet:  Hospital:  Diet Order   Procedures    Diet: Cardiac; Healthy Heart (2-3 Na+); Fluid Consistency: Thin (IDDSI 0)       Discharge Activity:       CODE STATUS:  Code Status and Medical Interventions: CPR (Attempt to Resuscitate); Full Support    Ordered at: 11/17/24 2104     Code Status (Patient has no pulse and is not breathing):    CPR (Attempt to Resuscitate)     Medical Interventions (Patient has pulse or is breathing):    Full Support         No future appointments.    Additional Instructions for the Follow-ups that You Need to Schedule       Ambulatory Referral to Neurology   As directed      Discharge Follow-up with PCP   As directed       Currently Documented PCP:    Provider, No Known    PCP Phone Number:    None     Follow Up Details: one week        Discharge Follow-up with Specified Provider: Dr. Rios; 1 Week   As directed      To: Dr. Rios   Follow Up: 1 Week                Pertinent  and/or Most Recent Results     PROCEDURES:   None    LAB RESULTS:      Lab 11/21/24 0429 11/19/24 0405 11/17/24  1820   WBC 4.87 4.45 5.55   HEMOGLOBIN 14.2 14.5 15.5   HEMATOCRIT 45.2 45.1 47.4   PLATELETS 234 246 281   NEUTROS ABS  --  2.47 3.64   IMMATURE GRANS (ABS)  --  0.02 0.01   LYMPHS ABS  --  1.17 1.14   MONOS ABS  --  0.64 0.62   EOS ABS  --  0.12 0.11   MCV 89.5 89.0 88.9   PROTIME  --   --  13.7   APTT  --   --  33.9         Lab 11/21/24 0429 11/19/24 0405 11/18/24  1629 11/17/24  1820   SODIUM 142 139 142 142   POTASSIUM 4.3 4.0 4.4 4.3   CHLORIDE 109* 106 108* 106   CO2 25.9 23.0 23.7 25.7   ANION GAP 7.1 10.0 10.3 10.3   BUN 25* 21* 22* 22*   CREATININE 2.11* 1.92* 1.97* 2.16*   EGFR 35.4* 39.6* 38.4* 34.4*   GLUCOSE 95 89 91 100*   CALCIUM 9.0 8.8 9.1 9.2   MAGNESIUM 2.0 2.2  --   --    PHOSPHORUS 3.1 3.0  --   --    HEMOGLOBIN A1C  --   --   --  5.20   TSH  --   --   --  3.600         Lab 11/21/24 0429 11/19/24  0405 11/17/24  1820   TOTAL PROTEIN  --  6.6 7.6   ALBUMIN 3.4* 3.6 4.2   GLOBULIN  --   --  3.4   ALT (SGPT)  --  21 26   AST (SGOT)  --  19 23   BILIRUBIN  --  0.4 0.4   INDIRECT BILIRUBIN  --  0.3  --    BILIRUBIN DIRECT  --  0.1  --    ALK PHOS  --  85 95         Lab 11/17/24  1820   HSTROP T 22*   PROTIME 13.7   INR 1.03          Lab 11/17/24  1820   CHOLESTEROL 273*   LDL CHOL 180*   HDL CHOL 44   TRIGLYCERIDES 255*         Lab 11/18/24  0430 11/17/24  1820   ABO TYPING A A   RH TYPING Positive Positive   ANTIBODY SCREEN  --  Negative         Brief Urine Lab Results  (Last result in the past 365 days)        Color   Clarity   Blood   Leuk Est   Nitrite   Protein   CREAT   Urine HCG        11/18/24 1943             87.1               Microbiology Results (last 10 days)       ** No results found for the last 240 hours. **            CT Angiogram Head    Result Date: 11/18/2024  Impression: (COMBINED) No emergent large vessel occlusion. No definite hemodynamically significant arterial stenoses are seen. The bilateral vertebral arteries patent and codominant. The study is limited, especially due to venous contamination and limitations of the overall intracranial arterial opacification. Please see above comments for further detail. Portions of this note were completed with a voice recognition program. Electronically Signed: Stalin Hernández MD  11/18/2024 10:18 PM EST  Workstation ID: [id number]    CT Angiogram Neck    Result Date: 11/18/2024  Impression: (COMBINED) No emergent large vessel occlusion. No definite hemodynamically significant arterial stenoses are seen. The bilateral vertebral arteries patent and codominant. The study is limited, especially due to venous contamination and limitations of the overall intracranial arterial opacification. Please see above comments for further detail. Portions of this note were completed with a voice recognition program. Electronically Signed: Stalin Hernández MD  11/18/2024 10:18 PM EST  Workstation ID: [id number]    MRI Angiogram Head Without Contrast    Result Date: 11/18/2024  Impression: Impression: Examination is limited due to motion artifact. Poor flow signal seen about the right MCA trifurcation with possible occlusion of a proximal anterior right M2 MCA branch vessel (see series 2, image 69).  Recommend further evaluation with CT angiogram The above findings were discussed with Sherif Bowden via telephone on 11/18/2024 9:08 AM EST. Electronically Signed: Chaparro Funez MD  11/18/2024 9:10 AM EST  Workstation ID: [id number]    MRI Brain Without Contrast    Result Date: 11/18/2024  Impression: Impression: 1. Restricted diffusion within the right centrum semiovale consistent with acute infarct. 2. Moderate chronic microvascular ischemic changes. These results were called to the patient's nurse on 11/18/2024 at 9:07 a.m. Eastern standard time. Electronically Signed: Bernadine Aguila MD  11/18/2024 9:07 AM EST  Workstation ID: [id number]    US Renal Bilateral    Result Date: 11/18/2024  Impression:  1. No hydronephrosis is seen bilaterally.  2. No definite nephrolithiasis.  3. No suspicious renal masses are seen.  4. The renal parenchymal is abnormally increased bilaterally, suggesting chronic medical renal disease.  5. No renal cortical scarring is suggested.  6. Grossly, no definite focal abnormality of the urinary bladder.  7. Please see above comments for further detail.    Portions of this note were completed with a voice recognition program.  Electronically Signed By-Stalin Hernández MD On:11/18/2024 2:10 AM      XR Chest 1 View    Result Date: 11/17/2024  Impression: No acute infiltrate is appreciated. No cardiac enlargement.    Portions of this note were completed with a voice recognition program.  Electronically Signed By-Stalin Hernández MD On:11/17/2024 7:05 PM      CT Head Without Contrast Stroke Protocol    Result Date: 11/17/2024  Impression: Impression: No evidence of acute intracranial abnormality. Electronically Signed: Jt Schwartz MD  11/17/2024 6:36 PM EST  Workstation ID: [id number]     Results for orders placed during the hospital encounter of 11/17/24    Duplex Carotid Ultrasound CAR    Interpretation Summary    Right internal carotid artery demonstrates normal flow without evidence of  hemodynamically significant stenosis.    Antegrade right vertebral flow.    All other right side carotid system vessels are normal.    Left internal carotid artery demonstrates normal flow without evidence of hemodynamically significant stenosis.    Antegrade left vertebral flow.    All other left side carotid system vessels are normal.      Results for orders placed during the hospital encounter of 11/17/24    Duplex Carotid Ultrasound CAR    Interpretation Summary    Right internal carotid artery demonstrates normal flow without evidence of hemodynamically significant stenosis.    Antegrade right vertebral flow.    All other right side carotid system vessels are normal.    Left internal carotid artery demonstrates normal flow without evidence of hemodynamically significant stenosis.    Antegrade left vertebral flow.    All other left side carotid system vessels are normal.      Results for orders placed during the hospital encounter of 11/17/24    Adult Transthoracic Echo Complete W/ Cont if Necessary Per Protocol    Interpretation Summary  Normal chamber sizes.  LV has severe concentric hypertrophy.  No regional wall motion abnormalities are present.  Systolic function is normal calculated LVEF is greater than 55%.  Moderately abnormal diastolic function is present with elevated left atrial pressure.  There is no dagger shaped LVOT Doppler outflow jet identified concerning for LVOT obstruction.  No systolic anterior motion of anterior mitral valve leaflet is noted.  LVOT gradient is less than 5 mmHg.    RV has normal systolic function.  Trileaflet aortic valve.  There is no aortic valve stenosis.  Structurally normal tricuspid valve and mitral valve.  No significant MR or TR is noted.  No pericardial effusion is noted.  Normal pulmonary venous flow pattern is noted  Aortic root has normal dimensions.  Ascending aorta is dilated 3.7 cm.  IVC is normal sized.  Estimated right atrial pressure is 0 to 5 mmHg    No  prior studies available for comparison    Recommendations  Cardiac MRI is recommended for further risk stratification.  In context of syncope, HCM needs to be ruled out.  Outpatient follow-up with cardiology as recommended.      Labs Pending at Discharge:        Time spent on Discharge including face to face service: 35 minutes    Electronically signed by GIGI Durant, 11/21/24, 5:15 PM EST.      Attending Documentation:  Patient independently seen and evaluated, above documentation reflects plan put forth during bedside rounds.  More than 51% of the time of this patient encounter was performed by me. I discussed the care plan with LEON Nath PA-C, I agree with his findings and plan as documented, what I have added to the care plan and modified is as follows in my documentation and my medical decision making; very nice 58-year-old male presented with stroke, remained in the hospital yesterday for better blood pressure control, it is improved, he is stable for discharge home today.  Electronically signed by Josiah Tyler MD, 11/21/24, 6:02 PM EST.

## 2024-11-21 NOTE — PROGRESS NOTES
[facility]     Progress Note    Patient Name: Rosalio Restrepo  : 1965  MRN: [id number]  Primary Care Physician:  Provider, No Known  Date of admission: 2024    Subjective patient is feeling fine has no new issues blood pressure is running on the higher side    Review of Systems  All review of systems are negative except as mentioned in subjective complaints.    Objective   Objective     Vitals:   Temp:  [97.7 °F (36.5 °C)-98.3 °F (36.8 °C)] 97.7 °F (36.5 °C)  Heart Rate:  [63-65] 64  Resp:  [16-20] 16  BP: (165-202)/() 173/110  Physical Exam    Constitutional: Awake, alert responsive, conversant, no obvious distress              Psychiatric:  Appropriate affect, cooperative   Neurologic:  Awake alert ,oriented x 3, strength symmetric in all extremities, Cranial Nerves grossly intact to confrontation, speech clear   Eyes:   PERRLA, sclerae anicteric, no conjunctival injection   HEENT:  Moist mucous membranes, no nasal or eye discharge, no throat congestion   Neck:   Supple, no thyromegaly, no lymphadenopathy, trachea midline, no elevated JVD   Respiratory:  Clear to auscultation bilaterally, nonlabored respirations    Cardiovascular: RRR, no murmurs, rubs, or gallops, palpable pedal pulses bilaterally, No bilateral ankle edema   Gastrointestinal: Positive bowel sounds, soft, nontender, nondistended, no organomegaly   Musculoskeletal:  No clubbing or cyanosis to extremities,muscle wasting, joint swelling, muscle weakness             Skin:                      No rashes, bruising, skin ulcers, petechiae or ecchymosis    Result Review    Result Review:  I have personally reviewed the results from the time of this admission to 2024 08:40 EST and agree with these findings:  []  Laboratory  []  Microbiology  []  Radiology  []  EKG/Telemetry   []  Cardiology/Vascular   []  Pathology  []  Old records  []  Other:    Results from last 7 days   Lab Units 24  0429 24  0405 24  6361    WBC 10*3/mm3 4.87 4.45 5.55   HEMOGLOBIN g/dL 14.2 14.5 15.5   PLATELETS 10*3/mm3 234 246 281     Results from last 7 days   Lab Units 11/21/24  0429 11/19/24  0405 11/18/24  1629 11/17/24  1820   SODIUM mmol/L 142 139 142 142   POTASSIUM mmol/L 4.3 4.0 4.4 4.3   CHLORIDE mmol/L 109* 106 108* 106   CO2 mmol/L 25.9 23.0 23.7 25.7   ANION GAP mmol/L 7.1 10.0 10.3 10.3   BUN mg/dL 25* 21* 22* 22*   CREATININE mg/dL 2.11* 1.92* 1.97* 2.16*   GLUCOSE mg/dL 95 89 91 100*       Assessment & Plan   Assessment / Plan       Active Hospital Problems:    Active Hospital Problems    Diagnosis  POA    **Stroke [I63.9]  Yes    Essential hypertension [I10]  Unknown    CKD (chronic kidney disease), stage III [N18.30]  Unknown     Most likely secondary to hypertension  1.5 g of proteinuria  Urine analysis otherwise unremarkable  Renal ultrasound showed increased cortical echogenicity         Plan:   Patient can be discharged on amlodipine 10 mg and lisinopril 40 mg and we will see patient in the office next week.  Blood pressure is always can be managed better as outpatient  Patient need to check his daily blood pressure at home and bring log with him so we can make adjustment in antihypertensive medications  Discharge planning per primary team       Electronically signed by Barby Rios MD, 11/21/24, 8:40 AM EST.

## 2024-11-22 LAB
QT INTERVAL: 407 MS
QTC INTERVAL: 429 MS

## 2024-11-22 NOTE — OUTREACH NOTE
Prep Survey      Flowsheet Row Responses   [facility] facility patient discharged from? [facility]   Is LACE score < 7 ? No   Eligibility Readm Mgmt   Discharge diagnosis Acute Stroke   Does the patient have one of the following disease processes/diagnoses(primary or secondary)? Stroke   Does the patient have Home health ordered? No   Is there a DME ordered? No   Prep survey completed? Yes            Lisette LEONARD - Registered Nurse

## 2024-11-25 ENCOUNTER — OFFICE VISIT (OUTPATIENT)
Dept: FAMILY MEDICINE CLINIC | Facility: CLINIC | Age: 59
End: 2024-11-25
Payer: MEDICAID

## 2024-11-25 VITALS
WEIGHT: 247.6 LBS | DIASTOLIC BLOOD PRESSURE: 67 MMHG | HEART RATE: 96 BPM | BODY MASS INDEX: 38.86 KG/M2 | HEIGHT: 67 IN | OXYGEN SATURATION: 98 % | SYSTOLIC BLOOD PRESSURE: 156 MMHG

## 2024-11-25 DIAGNOSIS — I63.511 CEREBROVASCULAR ACCIDENT (CVA) DUE TO OCCLUSION OF RIGHT MIDDLE CEREBRAL ARTERY: ICD-10-CM

## 2024-11-25 DIAGNOSIS — R80.9 PROTEINURIA, UNSPECIFIED TYPE: ICD-10-CM

## 2024-11-25 DIAGNOSIS — E78.2 MIXED HYPERLIPIDEMIA: ICD-10-CM

## 2024-11-25 DIAGNOSIS — I10 ESSENTIAL HYPERTENSION: Primary | ICD-10-CM

## 2024-11-25 DIAGNOSIS — I51.7 LVH (LEFT VENTRICULAR HYPERTROPHY): ICD-10-CM

## 2024-11-25 DIAGNOSIS — N18.32 HYPERTENSIVE KIDNEY DISEASE WITH STAGE 3B CHRONIC KIDNEY DISEASE: ICD-10-CM

## 2024-11-25 DIAGNOSIS — I12.9 HYPERTENSIVE KIDNEY DISEASE WITH STAGE 3B CHRONIC KIDNEY DISEASE: ICD-10-CM

## 2024-11-25 PROCEDURE — 3077F SYST BP >= 140 MM HG: CPT | Performed by: FAMILY MEDICINE

## 2024-11-25 PROCEDURE — 3078F DIAST BP <80 MM HG: CPT | Performed by: FAMILY MEDICINE

## 2024-11-25 PROCEDURE — 99214 OFFICE O/P EST MOD 30 MIN: CPT | Performed by: FAMILY MEDICINE

## 2024-11-25 RX ORDER — METOPROLOL SUCCINATE 25 MG/1
25 TABLET, EXTENDED RELEASE ORAL DAILY
Qty: 30 TABLET | Refills: 1 | Status: SHIPPED | OUTPATIENT
Start: 2024-11-25

## 2024-11-25 NOTE — PROGRESS NOTES
"Chief Complaint  Hospital Follow Up Visit (Stroke/New pt /)    Subjective     Problem List  Visit Diagnosis   Encounters  Notes  Medications  Labs  Result Review Imaging  Media    Rosalio Restrepo presents to [facility]  History of Present Illness    History of Present Illness  The patient presents to establish care and follow up since the stroke. He is accompanied by an adult female.    He was admitted to the hospital for an acute stroke, during which an MRI revealed a possible occlusion. Prior to his hospitalization, he was not on any medications. His current regimen includes amlodipine, aspirin, atorvastatin, Plavix, and lisinopril. He is currently on atorvastatin 40 mg and reports no side effects.     He was also diagnosed with chronic kidney disease. An echocardiogram was performed, showing left ventricular enlargement. His blood pressure was recorded as 190/160 during his hospital stay. He did not maintain a blood pressure log at home.    He has not been under the care of a primary care physician for several years. He reports no chest pain or shortness of breath and feels well overall.       Objective   Vital Signs:   /67   Pulse 96   Ht 170.2 cm (67\")   Wt 112 kg (247 lb 9.6 oz)   SpO2 98%   BMI 38.78 kg/m²     Physical Exam  Vitals reviewed.   Constitutional:       General: He is not in acute distress.     Appearance: Normal appearance.   HENT:      Head: Normocephalic and atraumatic.      Mouth/Throat:      Mouth: Mucous membranes are moist.   Eyes:      Conjunctiva/sclera: Conjunctivae normal.   Cardiovascular:      Rate and Rhythm: Normal rate and regular rhythm.      Heart sounds: Normal heart sounds.   Pulmonary:      Effort: Pulmonary effort is normal.      Breath sounds: Normal breath sounds.   Musculoskeletal:         General: No swelling.      Cervical back: Normal range of motion and neck supple.   Skin:     General: Skin is warm.   Neurological:      " General: No focal deficit present.      Mental Status: He is alert.   Psychiatric:         Mood and Affect: Mood normal.         Behavior: Behavior normal.          Physical Exam  Vital Signs  Heart rate is 96.    Result Review :Labs  Result Review  Imaging  Med Tab  Media  Procedures       Common labs          11/18/2024    16:29 11/19/2024    04:05 11/21/2024    04:29   Common Labs   Glucose 91  89  95    BUN 22  21  25    Creatinine 1.97  1.92  2.11    Sodium 142  139  142    Potassium 4.4  4.0  4.3    Chloride 108  106  109    Calcium 9.1  8.8  9.0    Albumin  3.6  3.4    Total Bilirubin  0.4     Alkaline Phosphatase  85     AST (SGOT)  19     ALT (SGPT)  21     WBC  4.45  4.87    Hemoglobin  14.5  14.2    Hematocrit  45.1  45.2    Platelets  246  234        Results for orders placed or performed during the hospital encounter of 11/17/24   Comprehensive Metabolic Panel    Collection Time: 11/17/24  6:20 PM    Specimen: Blood   Result Value Ref Range    Glucose 100 (H) 65 - 99 mg/dL    BUN 22 (H) 6 - 20 mg/dL    Creatinine 2.16 (H) 0.76 - 1.27 mg/dL    Sodium 142 136 - 145 mmol/L    Potassium 4.3 3.5 - 5.2 mmol/L    Chloride 106 98 - 107 mmol/L    CO2 25.7 22.0 - 29.0 mmol/L    Calcium 9.2 8.6 - 10.5 mg/dL    Total Protein 7.6 6.0 - 8.5 g/dL    Albumin 4.2 3.5 - 5.2 g/dL    ALT (SGPT) 26 1 - 41 U/L    AST (SGOT) 23 1 - 40 U/L    Alkaline Phosphatase 95 39 - 117 U/L    Total Bilirubin 0.4 0.0 - 1.2 mg/dL    Globulin 3.4 gm/dL    A/G Ratio 1.2 g/dL    BUN/Creatinine Ratio 10.2 7.0 - 25.0    Anion Gap 10.3 5.0 - 15.0 mmol/L    eGFR 34.4 (L) >60.0 mL/min/1.73   Protime-INR    Collection Time: 11/17/24  6:20 PM    Specimen: Blood   Result Value Ref Range    Protime 13.7 11.8 - 14.9 Seconds    INR 1.03 0.86 - 1.15   aPTT    Collection Time: 11/17/24  6:20 PM    Specimen: Blood   Result Value Ref Range    PTT 33.9 24.2 - 34.2 seconds   Single High Sensitivity Troponin T    Collection Time: 11/17/24  6:20 PM     Specimen: Blood   Result Value Ref Range    HS Troponin T 22 (H) <22 ng/L   CBC Auto Differential    Collection Time: 11/17/24  6:20 PM    Specimen: Blood   Result Value Ref Range    WBC 5.55 3.40 - 10.80 10*3/mm3    RBC 5.33 4.14 - 5.80 10*6/mm3    Hemoglobin 15.5 13.0 - 17.7 g/dL    Hematocrit 47.4 37.5 - 51.0 %    MCV 88.9 79.0 - 97.0 fL    MCH 29.1 26.6 - 33.0 pg    MCHC 32.7 31.5 - 35.7 g/dL    RDW 12.5 12.3 - 15.4 %    RDW-SD 40.9 37.0 - 54.0 fl    MPV 11.2 6.0 - 12.0 fL    Platelets 281 140 - 450 10*3/mm3    Neutrophil % 65.6 42.7 - 76.0 %    Lymphocyte % 20.5 19.6 - 45.3 %    Monocyte % 11.2 5.0 - 12.0 %    Eosinophil % 2.0 0.3 - 6.2 %    Basophil % 0.5 0.0 - 1.5 %    Immature Grans % 0.2 0.0 - 0.5 %    Neutrophils, Absolute 3.64 1.70 - 7.00 10*3/mm3    Lymphocytes, Absolute 1.14 0.70 - 3.10 10*3/mm3    Monocytes, Absolute 0.62 0.10 - 0.90 10*3/mm3    Eosinophils, Absolute 0.11 0.00 - 0.40 10*3/mm3    Basophils, Absolute 0.03 0.00 - 0.20 10*3/mm3    Immature Grans, Absolute 0.01 0.00 - 0.05 10*3/mm3    nRBC 0.0 0.0 - 0.2 /100 WBC   Hemoglobin A1c    Collection Time: 11/17/24  6:20 PM    Specimen: Blood   Result Value Ref Range    Hemoglobin A1C 5.20 4.80 - 5.60 %   TSH Rfx On Abnormal To Free T4    Collection Time: 11/17/24  6:20 PM    Specimen: Blood   Result Value Ref Range    TSH 3.600 0.270 - 4.200 uIU/mL   Lipid Panel    Collection Time: 11/17/24  6:20 PM    Specimen: Blood   Result Value Ref Range    Total Cholesterol 273 (H) 0 - 200 mg/dL    Triglycerides 255 (H) 0 - 150 mg/dL    HDL Cholesterol 44 40 - 60 mg/dL    LDL Cholesterol  180 (H) 0 - 100 mg/dL    VLDL Cholesterol 49 (H) 5 - 40 mg/dL    LDL/HDL Ratio 4.05    Type & Screen    Collection Time: 11/17/24  6:20 PM    Specimen: Blood   Result Value Ref Range    ABO Type A     RH type Positive     Antibody Screen Negative     T&S Expiration Date 11/24/2024 11:59:00 PM    Green Top (Gel)    Collection Time: 11/17/24  6:20 PM   Result Value Ref Range     Extra Tube Hold for add-ons.    Lavender Top    Collection Time: 11/17/24  6:20 PM   Result Value Ref Range    Extra Tube hold for add-on    Gold Top - SST    Collection Time: 11/17/24  6:20 PM   Result Value Ref Range    Extra Tube Hold for add-ons.    Light Blue Top    Collection Time: 11/17/24  6:20 PM   Result Value Ref Range    Extra Tube Hold for add-ons.    POC Glucose Once    Collection Time: 11/17/24  6:24 PM    Specimen: Blood   Result Value Ref Range    Glucose 113 (H) 70 - 99 mg/dL   ECG 12 Lead ED Triage Standing Order; Acute Stroke (Onset <12 hrs)    Collection Time: 11/17/24  6:53 PM   Result Value Ref Range    QT Interval 407 ms    QTC Interval 429 ms   Urinalysis With Microscopic If Indicated (No Culture) - Urine, Clean Catch    Collection Time: 11/17/24  8:56 PM    Specimen: Urine, Clean Catch   Result Value Ref Range    Color, UA Yellow Yellow, Straw    Appearance, UA Clear Clear    pH, UA 5.5 5.0 - 8.0    Specific Gravity, UA 1.022 1.005 - 1.030    Glucose, UA Negative Negative    Ketones, UA Negative Negative    Bilirubin, UA Negative Negative    Blood, UA Negative Negative    Protein, UA >=300 mg/dL (3+) (A) Negative    Leuk Esterase, UA Negative Negative    Nitrite, UA Negative Negative    Urobilinogen, UA 0.2 E.U./dL 0.2 - 1.0 E.U./dL   Urinalysis, Microscopic Only - Urine, Clean Catch    Collection Time: 11/17/24  8:56 PM    Specimen: Urine, Clean Catch   Result Value Ref Range    RBC, UA 0-2 None Seen, 0-2 /HPF    WBC, UA 0-2 None Seen, 0-2 /HPF    Bacteria, UA None Seen None Seen /HPF    Squamous Epithelial Cells, UA 0-2 None Seen, 0-2 /HPF    Hyaline Casts, UA 0-2 None Seen /LPF    Methodology Automated Microscopy    ABO RH Specimen Verification    Collection Time: 11/18/24  4:30 AM    Specimen: Hand, Right; Blood   Result Value Ref Range    ABO Type A     RH type Positive    Lavender Top    Collection Time: 11/18/24  4:30 AM   Result Value Ref Range    Extra Tube hold for add-on     Green Top (Gel)    Collection Time: 11/18/24  4:30 AM   Result Value Ref Range    Extra Tube Hold for add-ons.    Urinalysis With Microscopic If Indicated (No Culture) - Urine, Clean Catch    Collection Time: 11/18/24  4:53 AM    Specimen: Urine, Clean Catch   Result Value Ref Range    Color, UA Yellow Yellow, Straw    Appearance, UA Clear Clear    pH, UA 6.0 5.0 - 8.0    Specific Gravity, UA 1.016 1.005 - 1.030    Glucose, UA Negative Negative    Ketones, UA Negative Negative    Bilirubin, UA Negative Negative    Blood, UA Negative Negative    Protein, UA >=300 mg/dL (3+) (A) Negative    Leuk Esterase, UA Negative Negative    Nitrite, UA Negative Negative    Urobilinogen, UA 0.2 E.U./dL 0.2 - 1.0 E.U./dL   Urinalysis, Microscopic Only - Urine, Clean Catch    Collection Time: 11/18/24  4:53 AM    Specimen: Urine, Clean Catch   Result Value Ref Range    RBC, UA 0-2 None Seen, 0-2 /HPF    WBC, UA 0-2 None Seen, 0-2 /HPF    Bacteria, UA None Seen None Seen /HPF    Squamous Epithelial Cells, UA 0-2 None Seen, 0-2 /HPF    Hyaline Casts, UA 0-2 None Seen /LPF    Methodology Automated Microscopy    Adult Transthoracic Echo Complete W/ Cont if Necessary Per Protocol    Collection Time: 11/18/24 10:13 AM   Result Value Ref Range    LVIDd 4.4 cm    LVIDs 2.20 cm    IVSd 1.60 cm    LVPWd 1.60 cm    IVS/LVPW 1.00 cm    LVOT diam 2.20 cm    EDV(MOD-sp2) 136.0 ml    EDV(MOD-sp4) 136.6 ml    ESV(MOD-sp2) 51.1 ml    ESV(MOD-sp4) 48.4 ml    SV(MOD-sp2) 84.9 ml    SV(MOD-sp4) 88.2 ml    EF(MOD-sp2) 62.4 %    EF(MOD-sp4) 64.6 %    MV E max zackery 68.5 cm/sec    MV A max zackery 88.0 cm/sec    MV E/A 0.78     Med Peak E' Zackery 5.3 cm/sec    Lat Peak E' Zackery 4.9 cm/sec    Avg E/e' ratio 13.43     RV Base 3.5 cm    TAPSE (>1.6) 2.7 cm    RV S' 17.3 cm/sec    LA dimension (2D)  4.0 cm    LV V1 max 120.0 cm/sec    LV V1 max PG 5.8 mmHg    LV V1 mean PG 2.20 mmHg    LV V1 VTI 23.3 cm    Ao pk zackery 146.0 cm/sec    Ao max PG 8.5 mmHg    Ao mean PG 4.3  mmHg    Ao V2 VTI 25.8 cm    Ascending aorta 3.8 cm    LA ESV Index (BP) 23.4 ml/m2    Sinus 3.1 cm   Duplex Carotid Ultrasound CAR    Collection Time: 11/18/24 10:24 AM   Result Value Ref Range    Prox CCA .3 cm/sec    Prox CCA EDV 22.2 cm/sec    Dist CCA PSV 87.2 cm/sec    Dist CCA EDV 16.8 cm/sec    Prox ICA PSV 73.8 cm/sec    Prox ICA EDV 20.5 cm/sec    Mid ICA PSV 82.5 cm/sec    Mid ICA EDV 28.1 cm/sec    Dist ICA PSV 92.4 cm/sec    Dist ICA EDV 26.4 cm/sec    Prox ECA .0 cm/sec    Prox ECA EDV 23.0 cm/sec    Vertebral A PSV 36.7 cm/sec    Vertebral A EDV 12.4 cm/sec    Prox CCA .3 cm/sec    Prox CCA EDV 20.2 cm/sec    Dist CCA .2 cm/sec    Dist CCA EDV 18.8 cm/sec    Prox ICA PSV 61.1 cm/sec    Prox ICA EDV 26.2 cm/sec    Mid ICA PSV 74.2 cm/sec    Mid ICA EDV 21.0 cm/sec    Dist ICA PSV 88.1 cm/sec    Dist ICA EDV 25.0 cm/sec    Prox ECA PSV 88.1 cm/sec    Prox ECA EDV 14.7 cm/sec    Vertebral A PSV 55.1 cm/sec    Vertebral A EDV 15.9 cm/sec    XLRA VALENTIN RIGHT CAROTID BULB PSV 69.9 cm/sec    XLRA VALENTIN RIGHT CAROTID BULB EDV 17.7 cm/sec    XLRA VALENTIN LEFT CAROTID BULB PSV 48.8 cm/sec    XLRA VALENTIN LEFT CAROTID BULB EDV 13.1 cm/sec    ICA/CCA ratio 0.80     ICA/CCA ratio 0.60     Right arm /82 mmHg    Left arm /79 mmHg   Basic Metabolic Panel    Collection Time: 11/18/24  4:29 PM    Specimen: Arm, Right; Blood   Result Value Ref Range    Glucose 91 65 - 99 mg/dL    BUN 22 (H) 6 - 20 mg/dL    Creatinine 1.97 (H) 0.76 - 1.27 mg/dL    Sodium 142 136 - 145 mmol/L    Potassium 4.4 3.5 - 5.2 mmol/L    Chloride 108 (H) 98 - 107 mmol/L    CO2 23.7 22.0 - 29.0 mmol/L    Calcium 9.1 8.6 - 10.5 mg/dL    BUN/Creatinine Ratio 11.2 7.0 - 25.0    Anion Gap 10.3 5.0 - 15.0 mmol/L    eGFR 38.4 (L) >60.0 mL/min/1.73   Protein / Creatinine Ratio, Urine - Urine, Clean Catch    Collection Time: 11/18/24  7:43 PM    Specimen: Urine, Clean Catch   Result Value Ref Range    Creatinine, Urine 87.1  mg/dL    Total Protein, Urine 137.5 mg/dL    Protein/Creatinine Ratio, Urine 1.58    Basic Metabolic Panel    Collection Time: 11/19/24  4:05 AM    Specimen: Arm, Right; Blood   Result Value Ref Range    Glucose 89 65 - 99 mg/dL    BUN 21 (H) 6 - 20 mg/dL    Creatinine 1.92 (H) 0.76 - 1.27 mg/dL    Sodium 139 136 - 145 mmol/L    Potassium 4.0 3.5 - 5.2 mmol/L    Chloride 106 98 - 107 mmol/L    CO2 23.0 22.0 - 29.0 mmol/L    Calcium 8.8 8.6 - 10.5 mg/dL    BUN/Creatinine Ratio 10.9 7.0 - 25.0    Anion Gap 10.0 5.0 - 15.0 mmol/L    eGFR 39.6 (L) >60.0 mL/min/1.73   Magnesium    Collection Time: 11/19/24  4:05 AM    Specimen: Arm, Right; Blood   Result Value Ref Range    Magnesium 2.2 1.6 - 2.6 mg/dL   Phosphorus    Collection Time: 11/19/24  4:05 AM    Specimen: Arm, Right; Blood   Result Value Ref Range    Phosphorus 3.0 2.5 - 4.5 mg/dL   Hepatic Function Panel    Collection Time: 11/19/24  4:05 AM    Specimen: Arm, Right; Blood   Result Value Ref Range    Total Protein 6.6 6.0 - 8.5 g/dL    Albumin 3.6 3.5 - 5.2 g/dL    ALT (SGPT) 21 1 - 41 U/L    AST (SGOT) 19 1 - 40 U/L    Alkaline Phosphatase 85 39 - 117 U/L    Total Bilirubin 0.4 0.0 - 1.2 mg/dL    Bilirubin, Direct 0.1 0.0 - 0.3 mg/dL    Bilirubin, Indirect 0.3 mg/dL   CBC Auto Differential    Collection Time: 11/19/24  4:05 AM    Specimen: Arm, Right; Blood   Result Value Ref Range    WBC 4.45 3.40 - 10.80 10*3/mm3    RBC 5.07 4.14 - 5.80 10*6/mm3    Hemoglobin 14.5 13.0 - 17.7 g/dL    Hematocrit 45.1 37.5 - 51.0 %    MCV 89.0 79.0 - 97.0 fL    MCH 28.6 26.6 - 33.0 pg    MCHC 32.2 31.5 - 35.7 g/dL    RDW 12.5 12.3 - 15.4 %    RDW-SD 40.3 37.0 - 54.0 fl    MPV 11.6 6.0 - 12.0 fL    Platelets 246 140 - 450 10*3/mm3    Neutrophil % 55.5 42.7 - 76.0 %    Lymphocyte % 26.3 19.6 - 45.3 %    Monocyte % 14.4 (H) 5.0 - 12.0 %    Eosinophil % 2.7 0.3 - 6.2 %    Basophil % 0.7 0.0 - 1.5 %    Immature Grans % 0.4 0.0 - 0.5 %    Neutrophils, Absolute 2.47 1.70 - 7.00  10*3/mm3    Lymphocytes, Absolute 1.17 0.70 - 3.10 10*3/mm3    Monocytes, Absolute 0.64 0.10 - 0.90 10*3/mm3    Eosinophils, Absolute 0.12 0.00 - 0.40 10*3/mm3    Basophils, Absolute 0.03 0.00 - 0.20 10*3/mm3    Immature Grans, Absolute 0.02 0.00 - 0.05 10*3/mm3    nRBC 0.0 0.0 - 0.2 /100 WBC   Renal Function Panel    Collection Time: 11/21/24  4:29 AM    Specimen: Arm, Right; Blood   Result Value Ref Range    Glucose 95 65 - 99 mg/dL    BUN 25 (H) 6 - 20 mg/dL    Creatinine 2.11 (H) 0.76 - 1.27 mg/dL    Sodium 142 136 - 145 mmol/L    Potassium 4.3 3.5 - 5.2 mmol/L    Chloride 109 (H) 98 - 107 mmol/L    CO2 25.9 22.0 - 29.0 mmol/L    Calcium 9.0 8.6 - 10.5 mg/dL    Albumin 3.4 (L) 3.5 - 5.2 g/dL    Phosphorus 3.1 2.5 - 4.5 mg/dL    Anion Gap 7.1 5.0 - 15.0 mmol/L    BUN/Creatinine Ratio 11.8 7.0 - 25.0    eGFR 35.4 (L) >60.0 mL/min/1.73   CBC (No Diff)    Collection Time: 11/21/24  4:29 AM    Specimen: Arm, Right; Blood   Result Value Ref Range    WBC 4.87 3.40 - 10.80 10*3/mm3    RBC 5.05 4.14 - 5.80 10*6/mm3    Hemoglobin 14.2 13.0 - 17.7 g/dL    Hematocrit 45.2 37.5 - 51.0 %    MCV 89.5 79.0 - 97.0 fL    MCH 28.1 26.6 - 33.0 pg    MCHC 31.4 (L) 31.5 - 35.7 g/dL    RDW 12.2 (L) 12.3 - 15.4 %    RDW-SD 40.1 37.0 - 54.0 fl    MPV 11.6 6.0 - 12.0 fL    Platelets 234 140 - 450 10*3/mm3   Magnesium    Collection Time: 11/21/24  4:29 AM    Specimen: Arm, Right; Blood   Result Value Ref Range    Magnesium 2.0 1.6 - 2.6 mg/dL       Results    CT Angiogram Neck  Result Date: 11/18/2024  Impression: (COMBINED) No emergent large vessel occlusion. No definite hemodynamically significant arterial stenoses are seen. The bilateral vertebral arteries patent and codominant. The study is limited,    MRI Angiogram Head Without Contrast  Result Date: 11/18/2024  Impression: Impression: Examination is limited due to motion artifact. Poor flow signal seen about the right MCA trifurcation with possible occlusion of a proximal anterior  right M2 MCA branch vessel (see series 2, image 69). Recommend further evaluation with CT angiogram        Laboratory Studies  GFR was 35. Cholesterol was high. A1c was good. Thyroid function was good.    Imaging  MRI showed an occlusion. Echocardiogram showed signs of enlargement of the left ventricle.  MRI Brain Without Contrast  Result Date: 11/18/2024  Impression: Impression: 1. Restricted diffusion within the right centrum semiovale consistent with acute infarct. 2. Moderate chronic microvascular ischemic changes.    Echo Recommendations  Cardiac MRI is recommended for further risk stratification.  In context of syncope, HCM needs to be ruled out.  Outpatient follow-up with cardiology as recommended.         Procedures        Assessment and Plan CC Problem List  Visit Diagnosis   ROS  Review (Popup)  [facility]  Quality  BestPractice  Medications  SmartSets  SnapShot Encounters  Media   There are no diagnoses linked to this encounter.    Assessment & Plan  1. Recent Stroke.  He was treated for an acute stroke with an occlusion noted on MRI. He is currently on amlodipine, aspirin, atorvastatin, Plavix, and lisinopril. No new medications were started before hospitalization.    2. Chronic Kidney Disease.  He was diagnosed with stage 3 chronic kidney disease with a GFR of 35 upon discharge. Proteinuria was noted, likely due to hypertension. Medications such as Kerendia and SGLT2 inhibitors were discussed as potential options to support kidney function. A referral to a nephrologist will be made.    3. Hypertension.  His blood pressure remains elevated, though not as high as during his hospital stay (190/160). He is currently on maximum doses of lisinopril and amlodipine. A low dose of metoprolol will be initiated to help manage his blood pressure and reduce strain on the heart. He is advised to monitor his blood pressure daily at home.    4. Left Ventricular Enlargement.  An echocardiogram  revealed signs of left ventricular enlargement. A referral to a cardiologist will be made, and a cardiac MRI is recommended to further evaluate the condition. Medications to reduce pressure on the heart may be considered.    5. Hypercholesterolemia.  His cholesterol levels were significantly elevated. He is currently on atorvastatin 40 mg, which he is tolerating well. Cholesterol levels will be reassessed in 6 months, and the dosage may be increased to 80 mg if necessary.    6. Health Maintenance.  His A1c and thyroid levels were within normal ranges. Magnesium, CBC, liver function, and phosphorus levels were also within normal limits.      Patient Instructions   1 month follow up unless able to see specialist sooner.     Please keep a daily blood pressure and heart rate log for the next week; please call or message in with those readings so that we can adjust medication if needed.        {Class 2 Severe Obesity (BMI >=35 and <=39.9). Obesity-related health conditions include the following: hypertension and dyslipidemias. Obesity is newly identified. BMI is is above average; BMI management plan is completed. We discussed portion control and increasing exercise.            Follow Up Instructions Charge Capture  Follow-up Communications   No follow-ups on file.  Patient was given instructions and counseling regarding his condition or for health maintenance advice. Please see specific information pulled into the AVS if appropriate.       Transcribed from ambient dictation for Hay Keenan DO by Hay Keenan DO.  11/25/24   09:57 EST    Patient or patient representative verbalized consent for the use of Ambient Listening during the visit with  Hay Keenan DO for chart documentation. 11/25/2024  11:53 EST

## 2024-11-25 NOTE — PATIENT INSTRUCTIONS
1 month follow up unless able to see specialist sooner.     Please keep a daily blood pressure and heart rate log for the next week; please call or message in with those readings so that we can adjust medication if needed.

## 2024-11-26 ENCOUNTER — READMISSION MANAGEMENT (OUTPATIENT)
Dept: CALL CENTER | Facility: HOSPITAL | Age: 59
End: 2024-11-26
Payer: MEDICAID

## 2024-11-26 NOTE — OUTREACH NOTE
Stroke Week 1 Survey      Flowsheet Row Responses   [facility] operated by [facility] patient discharged from? [facility]   Does the patient have one of the following disease processes/diagnoses(primary or secondary)? Stroke   Week 1 attempt successful? Yes   Call start time 1457   Call end time 1504   Discharge diagnosis Acute Stroke   Meds reviewed with patient/caregiver? Yes   Is the patient having any side effects they believe may be caused by any medication additions or changes? No   Does the patient have all medications ordered at discharge? Yes   Is the patient taking all medications as directed (includes completed medication regime)? Yes   Does the patient have a primary care provider?  Yes   Does the patient have an appointment with their PCP within 7 days of discharge? Yes   Has the patient kept scheduled appointments due by today? N/A   Psychosocial issues? No   Does the patient require any assistance with activities of daily living such as eating, bathing, dressing, walking, etc.? No   Does the patient have any residual symptoms from stroke/TIA? No   Did the patient receive a copy of their discharge instructions? Yes   Nursing interventions Reviewed instructions with patient   What is the patient's perception of their health status since discharge? Improving   Nursing interventions Nurse provided patient education   Is the patient/caregiver able to teach back the risk factors for a stroke? High blood pressure-goal below 120/80, High Cholesterol, History of TIAs, Sleep apnea, Carotid or other artery disease, Physical inactivity and obesity   Is the patient/caregiver able to teach back signs and symptoms related to disease process for when to call PCP? Yes   Is the patient/caregiver able to teach back signs and symptoms related to disease process for when to call 911? Yes   If the patient is a current smoker, are they able to teach back resources for cessation? Not a smoker   Is the patient/caregiver able to teach back the  "hierarchy of who to call/visit for symptoms/problems? PCP, Specialist, Home health nurse, Urgent Care, ED, 911 Yes   Additional teach back comments States he is doing \"ok\".  Has seen his PCP twice.  Has f/u scheduled for cardiology on 12/19.   Is the patient able to teach back FAST for Stroke? T desiere: Call 9-1-1 right away, S peech: Listen for slurred speech, A rm: Check if one arm is weak, F ace: Look for an uneven smile, E yes: Check for vision loss, B alance: Watch for sudden loss of balance   Week 1 call completed? Yes   Wrap up additional comments Denies questions or needs at this time.   Call end time 1504            Samaria TOMPKINS - Licensed Nurse  "

## 2024-12-02 ENCOUNTER — TELEPHONE (OUTPATIENT)
Dept: NEUROLOGY | Facility: CLINIC | Age: 59
End: 2024-12-02

## 2024-12-02 NOTE — TELEPHONE ENCOUNTER
Provider: DR. HARRINGTON    Caller: Rosalio Restrepo    Relationship to Patient: Self    Pharmacy: N/A    Phone Number: [phone]    Reason for Call: NP APPOINTMENT SCHEDULED WITH DR. HARRINGTON. PLEASE MAIL OUT APPOINTMENT REMINDER WITH OFFICE INFORMATION. THANK YOU.

## 2024-12-19 ENCOUNTER — OFFICE VISIT (OUTPATIENT)
Dept: CARDIOLOGY | Facility: CLINIC | Age: 59
End: 2024-12-19
Payer: MEDICAID

## 2024-12-19 ENCOUNTER — LAB (OUTPATIENT)
Facility: HOSPITAL | Age: 59
End: 2024-12-19
Payer: MEDICAID

## 2024-12-19 VITALS
WEIGHT: 244 LBS | BODY MASS INDEX: 38.3 KG/M2 | DIASTOLIC BLOOD PRESSURE: 91 MMHG | SYSTOLIC BLOOD PRESSURE: 186 MMHG | HEART RATE: 61 BPM | HEIGHT: 67 IN

## 2024-12-19 DIAGNOSIS — I11.9 HYPERTENSIVE HEART DISEASE WITHOUT HEART FAILURE: ICD-10-CM

## 2024-12-19 DIAGNOSIS — I11.9 HYPERTENSIVE HEART DISEASE WITHOUT HEART FAILURE: Primary | ICD-10-CM

## 2024-12-19 LAB
ANION GAP SERPL CALCULATED.3IONS-SCNC: 8.5 MMOL/L (ref 5–15)
BUN SERPL-MCNC: 22 MG/DL (ref 8–23)
BUN/CREAT SERPL: 11.3 (ref 7–25)
CALCIUM SPEC-SCNC: 9.5 MG/DL (ref 8.6–10.5)
CHLORIDE SERPL-SCNC: 107 MMOL/L (ref 98–107)
CO2 SERPL-SCNC: 27.5 MMOL/L (ref 22–29)
CREAT SERPL-MCNC: 1.94 MG/DL (ref 0.76–1.27)
EGFRCR SERPLBLD CKD-EPI 2021: 38.9 ML/MIN/1.73
GLUCOSE SERPL-MCNC: 88 MG/DL (ref 65–99)
POTASSIUM SERPL-SCNC: 4.6 MMOL/L (ref 3.5–5.2)
SODIUM SERPL-SCNC: 143 MMOL/L (ref 136–145)

## 2024-12-19 PROCEDURE — 36415 COLL VENOUS BLD VENIPUNCTURE: CPT

## 2024-12-19 PROCEDURE — 80048 BASIC METABOLIC PNL TOTAL CA: CPT

## 2024-12-19 PROCEDURE — 1160F RVW MEDS BY RX/DR IN RCRD: CPT | Performed by: INTERNAL MEDICINE

## 2024-12-19 PROCEDURE — 99203 OFFICE O/P NEW LOW 30 MIN: CPT | Performed by: INTERNAL MEDICINE

## 2024-12-19 PROCEDURE — 1159F MED LIST DOCD IN RCRD: CPT | Performed by: INTERNAL MEDICINE

## 2024-12-19 RX ORDER — CARVEDILOL 6.25 MG/1
6.25 TABLET ORAL 2 TIMES DAILY
Qty: 180 TABLET | Refills: 3 | Status: SHIPPED | OUTPATIENT
Start: 2024-12-19

## 2024-12-19 NOTE — PROGRESS NOTES
CARDIOLOGY INITIAL CONSULT       Chief Complaint  Establish Care and Hypertension (Uncontrolled)    Subjective            Rosalio Restrepo presents to [facility] CARDIOLOGY  History of Present Illness    This is a 59-year-old male with no previous documented medical problems.  He presented emergency room on 11/17/2024 because of left-sided numbness and headache.  Workup showed acute stroke within the right centrum semiovale.  His blood pressure was significantly elevated.  Other workup showed chronic kidney disease, and severe LVH on echocardiogram he was started on, Plavix, statin and multiple antihypertensive medications.  He was discharged home.  He is going to have first visit with new primary care provider soon.    He denies any chest pain.  He does report shortness of breath with moderate exertion.  Denies palpitations or dizziness.  He has no pedal edema.  He is compliant with all his medications.  Left-sided weakness and numbness currently subsided.      Past History:    Chronic kidney disease  Essential hypertension with hypertensive heart disease, detected during hospital admission in November, 2024  Acute ischemic stroke with left-sided weakness, no residual deficits      Medical History:  Past Medical History:   Diagnosis Date    Stroke 11/17/2024       Family History: family history includes Cancer in his mother.     Social History: reports that he has never smoked. He has never been exposed to tobacco smoke. He has never used smokeless tobacco. He reports that he does not drink alcohol and does not use drugs.    Allergies: Patient has no known allergies.    Current Outpatient Medications on File Prior to Visit   Medication Sig    amLODIPine (NORVASC) 10 MG tablet Take 1 tablet by mouth Daily for 30 days.    aspirin 81 MG EC tablet Take 1 tablet by mouth Daily for 90 days.    atorvastatin (LIPITOR) 40 MG tablet Take 1 tablet by mouth Every Night for 30 days.    clopidogrel (PLAVIX) 75 MG  "tablet Take 1 tablet by mouth Daily for 90 days.    lisinopril (PRINIVIL,ZESTRIL) 40 MG tablet Take 1 tablet by mouth Daily for 30 days.    [DISCONTINUED] metoprolol succinate XL (Toprol XL) 25 MG 24 hr tablet Take 1 tablet by mouth Daily.     No current facility-administered medications on file prior to visit.          Review of Systems   Constitutional:  Negative for fatigue, unexpected weight gain and unexpected weight loss.   Eyes:  Negative for double vision.   Respiratory:  Positive for shortness of breath. Negative for cough and wheezing.    Cardiovascular:  Negative for chest pain, palpitations and leg swelling.   Gastrointestinal:  Negative for abdominal pain, nausea and vomiting.   Endocrine: Negative for cold intolerance, heat intolerance, polydipsia and polyuria.   Musculoskeletal:  Negative for arthralgias and back pain.   Skin:  Negative for color change.   Neurological:  Negative for dizziness, syncope, weakness and headache.   Hematological:  Does not bruise/bleed easily.        Objective     BP (!) 186/91   Pulse 61   Ht 170.2 cm (67\")   Wt 111 kg (244 lb)   BMI 38.22 kg/m²       Physical Exam  Constitutional:       General: He is awake. He is not in acute distress.     Appearance: Normal appearance.   Eyes:      Extraocular Movements: Extraocular movements intact.      Pupils: Pupils are equal, round, and reactive to light.   Neck:      Thyroid: No thyromegaly.      Vascular: No carotid bruit or JVD.   Cardiovascular:      Rate and Rhythm: Normal rate and regular rhythm.      Chest Wall: PMI is not displaced.      Heart sounds: Normal heart sounds, S1 normal and S2 normal. No murmur heard.     No friction rub. No gallop. No S3 or S4 sounds.   Pulmonary:      Effort: Pulmonary effort is normal. No respiratory distress.      Breath sounds: Normal breath sounds. No wheezing, rhonchi or rales.   Abdominal:      General: Bowel sounds are normal.      Palpations: Abdomen is soft.      Tenderness: " There is no abdominal tenderness.   Musculoskeletal:      Cervical back: Neck supple.      Right lower leg: No edema.      Left lower leg: No edema.   Skin:     Nails: There is no clubbing.   Neurological:      General: No focal deficit present.      Mental Status: He is alert and oriented to person, place, and time.           Result Review :     The following data was reviewed by: Boo Carpenter MD on 12/19/2024:    CMP          11/18/2024    16:29 11/19/2024    04:05 11/21/2024    04:29   CMP   Glucose 91  89  95    BUN 22  21  25    Creatinine 1.97  1.92  2.11    EGFR 38.4  39.6  35.4    Sodium 142  139  142    Potassium 4.4  4.0  4.3    Chloride 108  106  109    Calcium 9.1  8.8  9.0    Total Protein  6.6     Albumin  3.6  3.4    Total Bilirubin  0.4     Alkaline Phosphatase  85     AST (SGOT)  19     ALT (SGPT)  21     BUN/Creatinine Ratio 11.2  10.9  11.8    Anion Gap 10.3  10.0  7.1      CBC          11/17/2024    18:20 11/19/2024    04:05 11/21/2024    04:29   CBC   WBC 5.55  4.45  4.87    RBC 5.33  5.07  5.05    Hemoglobin 15.5  14.5  14.2    Hematocrit 47.4  45.1  45.2    MCV 88.9  89.0  89.5    MCH 29.1  28.6  28.1    MCHC 32.7  32.2  31.4    RDW 12.5  12.5  12.2    Platelets 281  246  234      TSH          11/17/2024    18:20   TSH   TSH 3.600      Lipid Panel          11/17/2024    18:20   Lipid Panel   Total Cholesterol 273    Triglycerides 255    HDL Cholesterol 44    VLDL Cholesterol 49    LDL Cholesterol  180    LDL/HDL Ratio 4.05         Data reviewed: Cardiology studies    Results for orders placed during the hospital encounter of 11/17/24    Adult Transthoracic Echo Complete W/ Cont if Necessary Per Protocol    Interpretation Summary  Normal chamber sizes.  LV has severe concentric hypertrophy.  No regional wall motion abnormalities are present.  Systolic function is normal calculated LVEF is greater than 55%.  Moderately abnormal diastolic function is present with elevated left atrial  pressure.  There is no dagger shaped LVOT Doppler outflow jet identified concerning for LVOT obstruction.  No systolic anterior motion of anterior mitral valve leaflet is noted.  LVOT gradient is less than 5 mmHg.    RV has normal systolic function.  Trileaflet aortic valve.  There is no aortic valve stenosis.  Structurally normal tricuspid valve and mitral valve.  No significant MR or TR is noted.  No pericardial effusion is noted.  Normal pulmonary venous flow pattern is noted  Aortic root has normal dimensions.  Ascending aorta is dilated 3.7 cm.  IVC is normal sized.  Estimated right atrial pressure is 0 to 5 mmHg    No prior studies available for comparison    Recommendations  Cardiac MRI is recommended for further risk stratification.  In context of syncope, HCM needs to be ruled out.  Outpatient follow-up with cardiology as recommended.                   Assessment and Plan        Diagnoses and all orders for this visit:    1. Hypertensive heart disease without heart failure (Primary)  Assessment & Plan:  Uncontrolled hypertension, detected during recent hospital admission for stroke.  Echocardiogram showed severe left ventricle hypertrophy, likely related to longstanding uncontrolled and untreated hypertension.  Blood pressure is still elevated.  Already has endorgan damage including chronic kidney disease.  He is compliant with all his medications.  Counseled extensively regarding low-sodium diet and dietary changes.  We will change metoprolol to carvedilol 6.25 mg twice daily.  Continue amlodipine and lisinopril without changes.  Will repeat basic metabolic panel now.      Other orders  -     carvedilol (COREG) 6.25 MG tablet; Take 1 tablet by mouth 2 (Two) Times a Day.  Dispense: 180 tablet; Refill: 3          I spent 21 minutes caring for Rosalio on this date of service. This time includes time spent by me in the following activities:reviewing tests, obtaining and/or reviewing a separately obtained history,  performing a medically appropriate examination and/or evaluation , ordering medications, tests, or procedures, and documenting information in the medical record    Follow Up     Return in about 1 month (around 1/19/2025) for Next scheduled follow up, with Lianne BYRNE.    Patient was given instructions and counseling regarding his condition or for health maintenance advice. Please see specific information pulled into the AVS if appropriate.

## 2024-12-20 ENCOUNTER — TELEPHONE (OUTPATIENT)
Dept: CARDIOLOGY | Facility: CLINIC | Age: 59
End: 2024-12-20
Payer: MEDICAID

## 2024-12-20 NOTE — PROGRESS NOTES
Labs done yesterday showed stable kidney functions.  Sodium and potassium levels are within normal limits.  Recommend to continue current medications including the changes made yesterday.      Electronically signed by Boo Carpenter MD, 12/20/24, 9:38 AM EST.

## 2024-12-20 NOTE — TELEPHONE ENCOUNTER
----- Message from Roxanne DICKSON sent at 12/20/2024 10:02 AM EST -----    ----- Message -----  From: Boo Carpenter MD  Sent: 12/20/2024   9:38 AM EST  To: Roxanne Pittman RN    Labs done yesterday showed stable kidney functions.  Sodium and potassium levels are within normal limits.  Recommend to continue current medications including the changes made yesterday.      Electronically signed by Boo Carpenter MD, 12/20/24, 9:38 AM EST.

## 2024-12-27 ENCOUNTER — OFFICE VISIT (OUTPATIENT)
Dept: FAMILY MEDICINE CLINIC | Facility: CLINIC | Age: 59
End: 2024-12-27
Payer: MEDICAID

## 2024-12-27 VITALS
OXYGEN SATURATION: 99 % | WEIGHT: 245 LBS | HEART RATE: 65 BPM | HEIGHT: 67 IN | SYSTOLIC BLOOD PRESSURE: 161 MMHG | DIASTOLIC BLOOD PRESSURE: 71 MMHG | BODY MASS INDEX: 38.45 KG/M2

## 2024-12-27 DIAGNOSIS — I63.511 CEREBROVASCULAR ACCIDENT (CVA) DUE TO OCCLUSION OF RIGHT MIDDLE CEREBRAL ARTERY: Primary | ICD-10-CM

## 2024-12-27 DIAGNOSIS — I11.9 HYPERTENSIVE HEART DISEASE WITHOUT HEART FAILURE: ICD-10-CM

## 2024-12-27 DIAGNOSIS — N18.32 STAGE 3B CHRONIC KIDNEY DISEASE: ICD-10-CM

## 2024-12-27 RX ORDER — AMLODIPINE BESYLATE 10 MG/1
10 TABLET ORAL DAILY
Qty: 90 TABLET | Refills: 3 | Status: SHIPPED | OUTPATIENT
Start: 2024-12-27

## 2024-12-27 RX ORDER — LISINOPRIL 40 MG/1
40 TABLET ORAL
Qty: 90 TABLET | Refills: 3 | Status: SHIPPED | OUTPATIENT
Start: 2024-12-27 | End: 2025-12-22

## 2024-12-27 RX ORDER — ASPIRIN 81 MG/1
81 TABLET ORAL DAILY
Qty: 90 TABLET | Refills: 1 | Status: SHIPPED | OUTPATIENT
Start: 2024-12-27 | End: 2025-06-25

## 2024-12-27 RX ORDER — ATORVASTATIN CALCIUM 40 MG/1
40 TABLET, FILM COATED ORAL DAILY
Qty: 90 TABLET | Refills: 3 | Status: SHIPPED | OUTPATIENT
Start: 2024-12-27

## 2024-12-27 RX ORDER — AMLODIPINE BESYLATE 10 MG/1
10 TABLET ORAL DAILY
COMMUNITY
End: 2024-12-27 | Stop reason: SDUPTHER

## 2024-12-27 RX ORDER — CLOPIDOGREL BISULFATE 75 MG/1
75 TABLET ORAL DAILY
Qty: 90 TABLET | Refills: 1 | Status: SHIPPED | OUTPATIENT
Start: 2024-12-27 | End: 2025-06-25

## 2024-12-27 NOTE — LETTER
December 27, 2024    Rosalio Restrepo  [address] [place] [address]    You had asked for description of your medications in the office and this did not get printed with your summary, so I have listed them below.    Amlodipine-calcium channel blocker for hypertension  Carvedilol-beta blocker/alpha blocker for hypertension  Lisinopril-ACE inhibitor (protective for kidney and heart cellular reconstruction secondary to blood pressure) for hypertension  Plavix-antiplatelet for history of stroke  Aspirin-antiplatelet for history of stroke  Atorvastatin-statin medication for cholesterol and prevention of stroke                          Hay Keenan, DO

## 2024-12-27 NOTE — PROGRESS NOTES
"Chief Complaint  Hypertension    Subjective     Problem List  Visit Diagnosis   Encounters  Notes  Medications  Labs  Result Review Imaging  Media    Rosalio Restrepo presents to [facility]  History of Present Illness    History of Present Illness  The patient is a 59-year-old male who presents for a follow-up of blood pressure.    He recently saw his cardiologist on 12/19/2024, at which time his blood pressure was significantly elevated at 186/91. His heart rate today is 65, and his blood pressure is 161/71, indicating that his systolic blood pressure remains above the goal range. He reports no chest pain or shortness of breath and feels good overall. His metoprolol was discontinued, and he was started on carvedilol. He has been out of his other blood pressure medications, amlodipine (we only had this listed as a historical med but patient states he was taking this ) and lisinopril, for the past 3 to 4 days.    He has also run out of his cholesterol medication, atorvastatin, and his antiplatelet medication, Plavix.    MEDICATIONS  Current: Carvedilol.  Discontinued: Metoprolol.  Past: Amlodipine, lisinopril, atorvastatin, Plavix.       Objective   Vital Signs:   /71 (BP Location: Right arm, Patient Position: Sitting, Cuff Size: Large Adult)   Pulse 65   Ht 170.2 cm (67\")   Wt 111 kg (245 lb)   SpO2 99%   BMI 38.37 kg/m²     Physical Exam  Vitals reviewed.   Constitutional:       General: He is not in acute distress.     Appearance: Normal appearance.   HENT:      Head: Normocephalic and atraumatic.      Mouth/Throat:      Mouth: Mucous membranes are moist.   Eyes:      Conjunctiva/sclera: Conjunctivae normal.   Cardiovascular:      Rate and Rhythm: Normal rate and regular rhythm.      Heart sounds: Normal heart sounds.   Pulmonary:      Effort: Pulmonary effort is normal.      Breath sounds: Normal breath sounds.   Musculoskeletal:         General: No swelling.      " Cervical back: Normal range of motion and neck supple.   Skin:     General: Skin is warm.   Neurological:      General: No focal deficit present.      Mental Status: He is alert.   Psychiatric:         Mood and Affect: Mood normal.         Behavior: Behavior normal.          Physical Exam  Vital Signs  Heart rate is 65. Blood pressure is 161/71.    Result Review :Labs  Result Review  Imaging  Med Tab  Media  Procedures       Common labs          11/19/2024    04:05 11/21/2024    04:29 12/19/2024    10:43   Common Labs   Glucose 89  95  88    BUN 21  25  22    Creatinine 1.92  2.11  1.94    Sodium 139  142  143    Potassium 4.0  4.3  4.6    Chloride 106  109  107    Calcium 8.8  9.0  9.5    Albumin 3.6  3.4     Total Bilirubin 0.4      Alkaline Phosphatase 85      AST (SGOT) 19      ALT (SGPT) 21      WBC 4.45  4.87     Hemoglobin 14.5  14.2     Hematocrit 45.1  45.2     Platelets 246  234         Results for orders placed or performed in visit on 12/19/24   Basic Metabolic Panel    Collection Time: 12/19/24 10:43 AM    Specimen: Arm, Right; Blood   Result Value Ref Range    Glucose 88 65 - 99 mg/dL    BUN 22 8 - 23 mg/dL    Creatinine 1.94 (H) 0.76 - 1.27 mg/dL    Sodium 143 136 - 145 mmol/L    Potassium 4.6 3.5 - 5.2 mmol/L    Chloride 107 98 - 107 mmol/L    CO2 27.5 22.0 - 29.0 mmol/L    Calcium 9.5 8.6 - 10.5 mg/dL    BUN/Creatinine Ratio 11.3 7.0 - 25.0    Anion Gap 8.5 5.0 - 15.0 mmol/L    eGFR 38.9 (L) >60.0 mL/min/1.73       Results  Laboratory Studies  Kidney function was stable on repeat labs done on 12/19/2024.           Procedures        Assessment and Plan CC Problem List  Visit Diagnosis   ROS  Review (Popup)  Health Maintenance  Quality  BestPractice  Medications  SmartSets  SnapShot Encounters  Media   Diagnoses and all orders for this visit:    1. Cerebrovascular accident (CVA) due to occlusion of right middle cerebral artery (Primary)  -     amLODIPine (NORVASC) 10 MG tablet; Take 1  tablet by mouth Daily.  Dispense: 90 tablet; Refill: 3  -     lisinopril (PRINIVIL,ZESTRIL) 40 MG tablet; Take 1 tablet by mouth Daily for 360 days.  Dispense: 90 tablet; Refill: 3  -     atorvastatin (Lipitor) 40 MG tablet; Take 1 tablet by mouth Daily.  Dispense: 90 tablet; Refill: 3  -     clopidogrel (PLAVIX) 75 MG tablet; Take 1 tablet by mouth Daily for 180 days.  Dispense: 90 tablet; Refill: 1  -     aspirin 81 MG EC tablet; Take 1 tablet by mouth Daily for 180 days.  Dispense: 90 tablet; Refill: 1    2. Stage 3b chronic kidney disease  -     amLODIPine (NORVASC) 10 MG tablet; Take 1 tablet by mouth Daily.  Dispense: 90 tablet; Refill: 3  -     lisinopril (PRINIVIL,ZESTRIL) 40 MG tablet; Take 1 tablet by mouth Daily for 360 days.  Dispense: 90 tablet; Refill: 3  -     atorvastatin (Lipitor) 40 MG tablet; Take 1 tablet by mouth Daily.  Dispense: 90 tablet; Refill: 3  -     clopidogrel (PLAVIX) 75 MG tablet; Take 1 tablet by mouth Daily for 180 days.  Dispense: 90 tablet; Refill: 1  -     aspirin 81 MG EC tablet; Take 1 tablet by mouth Daily for 180 days.  Dispense: 90 tablet; Refill: 1    3. Hypertensive heart disease without heart failure  -     amLODIPine (NORVASC) 10 MG tablet; Take 1 tablet by mouth Daily.  Dispense: 90 tablet; Refill: 3  -     lisinopril (PRINIVIL,ZESTRIL) 40 MG tablet; Take 1 tablet by mouth Daily for 360 days.  Dispense: 90 tablet; Refill: 3  -     atorvastatin (Lipitor) 40 MG tablet; Take 1 tablet by mouth Daily.  Dispense: 90 tablet; Refill: 3  -     clopidogrel (PLAVIX) 75 MG tablet; Take 1 tablet by mouth Daily for 180 days.  Dispense: 90 tablet; Refill: 1  -     aspirin 81 MG EC tablet; Take 1 tablet by mouth Daily for 180 days.  Dispense: 90 tablet; Refill: 1        Assessment & Plan  1. Hypertension.  His blood pressure remains elevated at 161/71, despite changes in medication. He has been out of his other blood pressure medications, including amlodipine and lisinopril, for the  past 3 to 4 days. All refills were sent today. The plan is to refill his prescriptions and have him slowly restart them to avoid excessively lowering his blood pressure. He is to keep a 1-week log of his blood pressure readings and call in with those readings so that adjustments can be made by either the primary care physician or the cardiologist. If his blood pressure is well controlled and he is seeing cardiology regularly, adjustments to his medication regimen will be based on the cardiologist's recommendations.    2. Chronic kidney disease stage 3.  His chronic kidney disease is likely secondary to hypertension. Repeat labs on 12/19/2024 showed stable kidney function.    3. Medication management.  He has been out of his cholesterol medication, atorvastatin, and his antiplatelet medication, Plavix. All refills were sent today.      Patient Instructions   Amlodipine-calcium channel blocker for hypertension  Carvedilol-beta blocker/alpha blocker for hypertension  Lisinopril-ACE inhibitor (protective for kidney and heart cellular reconstruction secondary to blood pressure) for hypertension  Plavix-antiplatelet for history of stroke  Aspirin-antiplatelet for history of stroke  Atorvastatin-statin medication for cholesterol and prevention of stroke            Follow Up Instructions Charge Capture  Follow-up Communications   No follow-ups on file.  Patient was given instructions and counseling regarding his condition or for health maintenance advice. Please see specific information pulled into the AVS if appropriate.       Transcribed from ambient dictation for Hay Keenan DO by Hay Keenan DO.  12/27/24   09:53 EST

## 2024-12-27 NOTE — PATIENT INSTRUCTIONS
Amlodipine-calcium channel blocker for hypertension  Carvedilol-beta blocker/alpha blocker for hypertension  Lisinopril-ACE inhibitor (protective for kidney and heart cellular reconstruction secondary to blood pressure) for hypertension  Plavix-antiplatelet for history of stroke  Aspirin-antiplatelet for history of stroke  Atorvastatin-statin medication for cholesterol and prevention of stroke

## 2025-02-03 ENCOUNTER — OFFICE VISIT (OUTPATIENT)
Dept: FAMILY MEDICINE CLINIC | Facility: CLINIC | Age: 60
End: 2025-02-03
Payer: MEDICAID

## 2025-02-03 VITALS
BODY MASS INDEX: 36.88 KG/M2 | SYSTOLIC BLOOD PRESSURE: 145 MMHG | HEIGHT: 67 IN | HEART RATE: 57 BPM | DIASTOLIC BLOOD PRESSURE: 75 MMHG | WEIGHT: 235 LBS | OXYGEN SATURATION: 98 %

## 2025-02-03 DIAGNOSIS — R11.2 NAUSEA AND VOMITING, UNSPECIFIED VOMITING TYPE: Primary | ICD-10-CM

## 2025-02-03 DIAGNOSIS — J40 BRONCHITIS: ICD-10-CM

## 2025-02-03 LAB
EXPIRATION DATE: NORMAL
FLUAV AG UPPER RESP QL IA.RAPID: NOT DETECTED
FLUBV AG UPPER RESP QL IA.RAPID: NOT DETECTED
INTERNAL CONTROL: NORMAL
Lab: NORMAL
SARS-COV-2 AG UPPER RESP QL IA.RAPID: NOT DETECTED

## 2025-02-03 PROCEDURE — 99214 OFFICE O/P EST MOD 30 MIN: CPT | Performed by: FAMILY MEDICINE

## 2025-02-03 PROCEDURE — 87428 SARSCOV & INF VIR A&B AG IA: CPT | Performed by: FAMILY MEDICINE

## 2025-02-03 RX ORDER — ONDANSETRON 4 MG/1
4-8 TABLET, ORALLY DISINTEGRATING ORAL EVERY 8 HOURS PRN
Qty: 30 TABLET | Refills: 0 | Status: SHIPPED | OUTPATIENT
Start: 2025-02-03

## 2025-02-03 RX ORDER — PROMETHAZINE HYDROCHLORIDE 25 MG/1
25 TABLET ORAL EVERY 8 HOURS PRN
Qty: 30 TABLET | Refills: 0 | Status: SHIPPED | OUTPATIENT
Start: 2025-02-03

## 2025-02-03 RX ORDER — AZITHROMYCIN 250 MG/1
TABLET, FILM COATED ORAL
Qty: 6 TABLET | Refills: 0 | Status: SHIPPED | OUTPATIENT
Start: 2025-02-03

## 2025-02-03 NOTE — PROGRESS NOTES
"Chief Complaint  Vomiting    Subjective     Problem List  Visit Diagnosis   Encounters  Notes  Medications  Labs  Result Review Imaging  Media    Rosalio Restrepo presents to [facility]  History of Present Illness    History of Present Illness  The patient is a 59-year-old male who presents for nausea and vomiting since Saturday, also noted to be lightheaded.    He reports a single episode of urination today and has been unable to retain any oral intake, resulting in immediate regurgitation. He has not taken any medications for nausea or vomiting at home. His symptoms began with a sore throat last week along with general URI symptoms. The condition escalated on Saturday with the onset of vomiting. Prior to the exacerbation, he experienced cold-like symptoms, including drainage and a sore throat, but did not have a fever. He reports that his vomitus does not contain blood. He has not had a bowel movement in several days but continues to pass gas. He does not have a current sore throat and has not recently contracted COVID-19. He does not have hematochezia or abdominal pain. He also reports feeling lightheaded, which improves post-emesis. He notes a wheezing sound when lying down.    ALLERGIES  The patient has no known allergies.       Objective   Vital Signs:   /75 (BP Location: Right arm, Patient Position: Sitting, Cuff Size: Large Adult)   Pulse 57   Ht 170.2 cm (67\")   Wt 107 kg (235 lb)   SpO2 98%   BMI 36.81 kg/m²     Physical Exam  Vitals reviewed.   Constitutional:       General: He is not in acute distress.     Appearance: Normal appearance.   HENT:      Head: Normocephalic and atraumatic.      Mouth/Throat:      Mouth: Mucous membranes are moist.   Eyes:      Conjunctiva/sclera: Conjunctivae normal.   Cardiovascular:      Rate and Rhythm: Normal rate and regular rhythm.      Heart sounds: Normal heart sounds.   Pulmonary:      Effort: Pulmonary effort is normal. "      Breath sounds: Normal breath sounds.   Musculoskeletal:         General: No swelling.      Cervical back: Normal range of motion and neck supple.   Skin:     General: Skin is warm.   Neurological:      General: No focal deficit present.      Mental Status: He is alert.   Psychiatric:         Mood and Affect: Mood normal.         Behavior: Behavior normal.          Physical Exam      Result Review :Labs  Result Review  Imaging  Med Tab  Media  Procedures       Common labs          11/19/2024    04:05 11/21/2024    04:29 12/19/2024    10:43   Common Labs   Glucose 89  95  88    BUN 21  25  22    Creatinine 1.92  2.11  1.94    Sodium 139  142  143    Potassium 4.0  4.3  4.6    Chloride 106  109  107    Calcium 8.8  9.0  9.5    Albumin 3.6  3.4     Total Bilirubin 0.4      Alkaline Phosphatase 85      AST (SGOT) 19      ALT (SGPT) 21      WBC 4.45  4.87     Hemoglobin 14.5  14.2     Hematocrit 45.1  45.2     Platelets 246  234         Results for orders placed or performed in visit on 02/03/25   POCT SARS-CoV-2 Antigen PAULINE + Flu    Collection Time: 02/03/25 11:52 AM    Specimen: Swab   Result Value Ref Range    SARS Antigen Not Detected Not Detected, Presumptive Negative    Influenza A Antigen PAULINE Not Detected Not Detected    Influenza B Antigen PAULINE Not Detected Not Detected    Internal Control Passed Passed    Lot Number [id number]     Expiration Date 2025/10/23        Results             Procedures        Assessment and Plan CC Problem List  Visit Diagnosis   ROS  Review (Popup)  Health Maintenance  Quality  BestPractice  Medications  SmartSets  SnapShot Encounters  Media   Diagnoses and all orders for this visit:    1. Nausea and vomiting, unspecified vomiting type (Primary)  -     POCT SARS-CoV-2 Antigen PAULINE + Flu  -     CBC w AUTO Differential; Future  -     Comprehensive metabolic panel; Future  -     Lipase; Future  -     ondansetron ODT (ZOFRAN-ODT) 4 MG disintegrating tablet; Place 1-2  tablets on the tongue Every 8 (Eight) Hours As Needed for Nausea or Vomiting.  Dispense: 30 tablet; Refill: 0  -     promethazine (PHENERGAN) 25 MG tablet; Take 1 tablet by mouth Every 8 (Eight) Hours As Needed for Nausea or Vomiting.  Dispense: 30 tablet; Refill: 0  -     azithromycin (Zithromax) 250 MG tablet; Take as directed  Dispense: 6 tablet; Refill: 0    2. Bronchitis  -     POCT SARS-CoV-2 Antigen PAULINE + Flu  -     azithromycin (Zithromax) 250 MG tablet; Take as directed  Dispense: 6 tablet; Refill: 0        Assessment & Plan  1. Nausea and vomiting.  The primary concern is potential dehydration due to inadequate fluid intake and output. The symptoms suggest a possible viral etiology, given the absence of bowel obstruction. Lightheadedness may be a consequence of mild dehydration. The respiratory symptoms preceding the onset of vomiting raise the possibility of aspiration pneumonia. However, the patient's ability to pass gas is reassuring. The presence of an upper respiratory infection for nearly a week necessitates careful selection of medication to avoid further gastric irritation.  Given that the symptoms for upper respiratory have been going on for a week without improvement we will also send an antibiotic. A prescription for Zofran dissolvable tablets and Phenergan tablets has been provided. A COVID-19 swab will be conducted to rule out COVID-19. If he is unable to urinate at least three times today, he should seek immediate medical attention at the emergency room for intravenous fluid administration. If there is no improvement in symptoms, further diagnostic measures such as laboratory tests and imaging studies will be considered.    2. Upper respiratory infection.  The infection appears to have started as an upper respiratory issue and may have progressed to a gastrointestinal bug, possibly due to an enterovirus.                 Follow Up Instructions Charge Capture  Follow-up Communications    Return if symptoms worsen or fail to improve.  Patient was given instructions and counseling regarding his condition or for health maintenance advice. Please see specific information pulled into the AVS if appropriate.       Transcribed from ambient dictation for Hay Keenan DO by Hay Keenan DO.  02/03/25   11:14 EST    Patient or patient representative verbalized consent for the use of Ambient Listening during the visit with  Hay Keenan DO for chart documentation. 2/3/2025  13:04 EST

## 2025-02-03 NOTE — PATIENT INSTRUCTIONS
Start with the dissolvable Zofran 1 to 2 tablets, if not helping you can also take the oral Phenergan.  If you are not able to take in oral liquids, are not peeing at least 3 times a day, you should go to the ER for possible dehydration and need for IV fluids.  If you are able to maintain hydration but symptoms are not improving I would also recommend that we check your labs to make sure that your electrolytes are not abnormal, these labs are ordered and can be done in the next few days if symptoms are persistent.  There is a good chance though that this is all viral but given that has been persistent for over a week we will cover with azithromycin antibiotic for respiratory bacterial coverage.  Close follow-up if not improving.

## 2025-02-24 ENCOUNTER — TELEPHONE (OUTPATIENT)
Dept: FAMILY MEDICINE CLINIC | Facility: CLINIC | Age: 60
End: 2025-02-24
Payer: MEDICAID

## 2025-02-24 NOTE — TELEPHONE ENCOUNTER
Spoke with patient regarding overdue orders.    CBC w AUTO Differential  Comprehensive metabolic panel  Lipase

## 2025-04-09 DIAGNOSIS — I11.9 HYPERTENSIVE HEART DISEASE WITHOUT HEART FAILURE: ICD-10-CM

## 2025-04-09 DIAGNOSIS — N18.32 STAGE 3B CHRONIC KIDNEY DISEASE: ICD-10-CM

## 2025-04-09 DIAGNOSIS — I63.511 CEREBROVASCULAR ACCIDENT (CVA) DUE TO OCCLUSION OF RIGHT MIDDLE CEREBRAL ARTERY: ICD-10-CM

## 2025-04-09 RX ORDER — LISINOPRIL 40 MG/1
40 TABLET ORAL
Qty: 90 TABLET | Refills: 3 | Status: SHIPPED | OUTPATIENT
Start: 2025-04-09